# Patient Record
Sex: FEMALE | Race: WHITE | Employment: OTHER | URBAN - METROPOLITAN AREA
[De-identification: names, ages, dates, MRNs, and addresses within clinical notes are randomized per-mention and may not be internally consistent; named-entity substitution may affect disease eponyms.]

---

## 2017-03-21 ENCOUNTER — APPOINTMENT (OUTPATIENT)
Dept: PHYSICAL THERAPY | Age: 47
End: 2017-03-21

## 2017-11-20 ENCOUNTER — HOSPITAL ENCOUNTER (OUTPATIENT)
Dept: PHYSICAL THERAPY | Age: 47
Discharge: HOME | End: 2017-11-20
Payer: COMMERCIAL

## 2017-11-20 PROCEDURE — 97110 THERAPEUTIC EXERCISES: CPT

## 2017-11-20 PROCEDURE — 97162 PT EVAL MOD COMPLEX 30 MIN: CPT

## 2017-11-20 NOTE — PROGRESS NOTES
PT DAILY TREATMENT NOTE     Patient Name: Hanley Moritz  Date:2017  : 1970  [x]  Patient  Verified  Payor: BLUE CROSS / Plan: [facility] PPO / Product Type: PPO /    In time:300  Out time:345  Total Treatment Time (min): 45  Visit #: 1 of 8    Treatment Area: Pain in right hip [M25.551]    SUBJECTIVE  Pain Level (0-10 scale): 1/10 seated at rest  Any medication changes, allergies to medications, adverse drug reactions, diagnosis change, or new procedure performed?: [x] No    [] Yes (see summary sheet for update)  Subjective functional status/changes:   [] No changes reported  See POC    OBJECTIVE    Modality rationale:    Min Type Additional Details    [] Estim:  []Unatt       []IFC  []Premod                        []Other:  []w/ice   []w/heat  Position:  Location:    [] Estim: []Att    []TENS instruct  []NMES                    []Other:  []w/US   []w/ice   []w/heat  Position:  Location:    []  Traction: [] Cervical       []Lumbar                       [] Prone          []Supine                       []Intermittent   []Continuous Lbs:  [] before manual  [] after manual    []  Ultrasound: []Continuous   [] Pulsed                           []1MHz   []3MHz W/cm2:  Location:    []  Iontophoresis with dexamethasone         Location: [] Take home patch   [] In clinic    []  Ice     []  heat  []  Ice massage  []  Laser   []  Anodyne Position:  Location:    []  Laser with stim  []  Other:  Position:  Location:    []  Vasopneumatic Device Pressure:       [] lo [] med [] hi   Temperature: [] lo [] med [] hi   [] Skin assessment post-treatment:  []intact []redness- no adverse reaction    []redness - adverse reaction:     37 min [x]Eval                  []Re-Eval       8 min Therapeutic Exercise:  [] See flow sheet :issued and reviewed HEP   Rationale: increase ROM and increase strength to improve the patients ability to normalize ADL's     min Therapeutic Activity:  []  See flow sheet : min Neuromuscular Re-education:  []  See flow sheet :        min Manual Therapy:          min Gait Training:  ___ feet with ___ device on level surfaces with ___ level of assist   Rationale: With   [] TE   [] TA   [] neuro   [] other: Patient Education: [x] Review HEP    [] Progressed/Changed HEP based on:   [] positioning   [] body mechanics   [] transfers   [] heat/ice application    [] other:      Other Objective/Functional Measures:   Physical Therapy Evaluation- Hip  Pt c/o bilateral foot pain (left > right) with tight calves and hamstrings that in turn is affecting her right hip, onset earlier this year. Pt is being seen by 2 different orthopedic doctors in the same practice for her feet and her hip. Pain can wake pt up at night with pt sleeping in S/L. Pt's condition complicated by recent diagnosis of fibromyalgia.   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)    Posture:    Gait:  [] Normal    [] Abnormal    [] Antalgic    [] NWB    Device: none    Describe:         ROM/Strength        AROM                     PROM        Strength (1-5)  Hip Left Right Left Right Left Right   Flexion     4/5 4/5   Extension     4/5 4/5   Abduction     4/5 4+/5 with increased pain   Adduction         ER     4/5 4/5   IR     4+/5 4+/5   Knee Left Right Left Right Left Right   Extension     4+/5 4+/5   Flexion     4+/5 4+/5   B ankle DF strength: 5/5 with increased pain at ankle     Flexibility: [] Unable to assess at this time  Hamstrings:    (L) Tightness= [] WNL   [x] Min   [] Mod   [] Severe 10 degrees    (R) Tightness= [] WNL   [x] Min   [] Mod   [] Severe 10 degrees  Quadriceps:    (L) Tightness= [] WNL   [] Min   [] Mod   [] Severe    (R) Tightness= [] WNL   [] Min   [] Mod   [] Severe  Gastroc:    (L) Tightness= [] WNL   [] Min   [] Mod   [x] Severe 0 degrees with increased pain at ankle    (R) Tightness= [] WNL   [] Min   [] Mod   [x] Severe 0 degrees                                   Palpation  [] Min  [x] Mod  [] Severe    Location: right piriformis/glut region, right ITB region  [] Min  [] Mod  [] Severe    Location:  [] Min  [] Mod  [] Severe    Location:    Optional Tests  Shelton/ Parvez Test: [] Neg    [] Pos  Fausto Test:  [] Neg    [] Pos  Scouring Test: [] Neg    [] Pos  Trendelenberg:  [] Neg    [] Pos [] Left    [] Right  OberTest:   [] Neg    [] Pos  Ely's Test:  [] Neg    [] Pos  Piriformis Test:  [] Neg    [] Pos   Sub-talor alignment: [] Neurtral [] Pronation [] Supination  Forefoot alignment: [] Neutral [] Varus [] Valgus  Functional Leg Length (cm):   Right:  Left:  Discrepancy:    Other tests/ comments:  Long sit test: left post vs right ant: correct with MET's       Pain Level (0-10 scale) post treatment: 0/10    ASSESSMENT/Changes in Function: see POC    Patient will continue to benefit from skilled PT services to modify and progress therapeutic interventions, address ROM deficits, address strength deficits, analyze and address soft tissue restrictions, analyze and cue movement patterns, analyze and modify body mechanics/ergonomics and assess and modify postural abnormalities to attain remaining goals.      [x]  See Plan of Care  []  See progress note/recertification  []  See Discharge Summary         Progress towards goals / Updated goals:  See POC      PLAN  [x]  Upgrade activities as tolerated     [x]  Continue plan of care  []  Update interventions per flow sheet       []  Discharge due to:_  [x]  Other: ROM/stretching, LE strengthening, manual techniques, mod prn      Lamonte Ortiz PT 11/20/2017  2:52 PM    Future Appointments  Date Time Provider Kolby Sara   11/20/2017 3:00 PM Lamonte Ortiz PT [facility]

## 2017-11-20 NOTE — PROGRESS NOTES
[facility] at [address] [place] [address]  Phone: [phone]   Fax: [phone]    Plan of Care/ Statement of Necessity for Physical Therapy Services     Patient name: Jameson Corbin Start of Care: 2017   Referral source: Epi Wilkinson MD : 1970    Medical Diagnosis: Pain in right hip [M25.551]   Onset Date:    Treatment Diagnosis: right hip pain   Prior Hospitalization: see medical history Provider#: [id number]   Medications: Verified on Patient summary List    Comorbidities: fibromyalgia, arthritis, weight change of > 10lbs recently   Prior Level of Function: normal activity, no deficits    The Plan of Care and following information is based on the information from the initial evaluation. Assessment/ key information: Pt is a 51 yo female presenting to clinic with c/o bilateral foot pain (left > right) with tight calves and hamstrings that in turn is affecting her right hip, onset earlier this year. Pt is being seen by 2 different orthopedic doctors in the same practice for her feet and her hip. Pain can wake pt up at night with pt sleeping in S/L. Pt's condition complicated by recent diagnosis of fibromyalgia. On exam, innominates require correction today with MET's. Bilateral LE strength 4/5 to 4+/5 throughout muscle groups tested. Pt has mild HS tightness and significant gastroc tightness bilaterally. She has TTP to right piriformis/glut and ITB region. Pt would benefit from skilled PT intervention to address the findings and to include PT for B foot pain. Evaluation Complexity History MEDIUM  Complexity : 1-2 comorbidities / personal factors will impact the outcome/ POC ; Examination MEDIUM Complexity : 3 Standardized tests and measures addressing body structure, function, activity limitation and / or participation in recreation  ;Presentation MEDIUM Complexity : Evolving with changing characteristics  ; Clinical Decision Making MEDIUM Complexity : FOTO score of 26-74  Overall Complexity Rating: MEDIUM  Problem List: pain affecting function, decrease ROM, decrease strength, decrease ADL/ functional abilitiies, decrease activity tolerance and decrease flexibility/ joint mobility   Treatment Plan may include any combination of the following: Therapeutic exercise, Therapeutic activities, Neuromuscular re-education, Physical agent/modality, Manual therapy, Aquatic therapy and Patient education  Patient / Family readiness to learn indicated by: asking questions, trying to perform skills and interest  Persons(s) to be included in education: patient (P)  Barriers to Learning/Limitations: None  Patient Goal (s): pain relief strategies  Patient Self Reported Health Status: fair  Rehabilitation Potential: good    Short Term Goals: To be accomplished in 2 weeks:  1. Patient will be independent and compliant with HEP to achieve other goals. Status at eval: issued and reviewed HEP  2. Pt will report >/= 25% improvement in symptoms to increase activity/position tolerance. Status at eval: 0%  3. Innominates will remain aligned and stable to achieve other goals. Status at eval: right ant vs left post    Long Term Goals: To be accomplished in 4 weeks:  1. Increase gastroc flexibility by 10 degrees to normalize function. Status at eval: 0 degrees bilaterally  2. Pt will report 50% improvement in symptoms to increase activity/position tolerance. Status at eval: 0%  3. Increase bilateral hip strength for flex, ext and ER >/= 4+/5 to normalize function. Status at eval: 4/5 bilaterally for all      Frequency / Duration: Patient to be seen 2 times per week for 4 weeks.     Patient/ Caregiver education and instruction: Diagnosis, prognosis, exercises   [x]  Plan of care has been reviewed with DEMETRICE Stiles, PT 11/20/2017 2:54 PM  _____________________________________________________________________  I certify that the above Therapy Services are being furnished while the patient is under my care. I agree with the treatment plan and certify that this therapy is necessary.     Physician's Signature:____________________  Date:__________Time:______    Please sign and return to [facility] at [address] [place] [address]  Phone: [phone]   Fax: [phone]

## 2017-11-22 ENCOUNTER — HOSPITAL ENCOUNTER (OUTPATIENT)
Dept: PHYSICAL THERAPY | Age: 47
Discharge: HOME | End: 2017-11-22
Payer: COMMERCIAL

## 2017-11-22 PROCEDURE — 97110 THERAPEUTIC EXERCISES: CPT

## 2017-11-22 PROCEDURE — 97140 MANUAL THERAPY 1/> REGIONS: CPT

## 2017-11-22 PROCEDURE — 97112 NEUROMUSCULAR REEDUCATION: CPT

## 2017-11-22 NOTE — PROGRESS NOTES
PT DAILY TREATMENT NOTE     Patient Name: Vinay Bradley  Date:2017  : 1970  [x]  Patient  Verified  Payor: BLUE CROSS / Plan: [facility] PPO / Product Type: PPO /    In time:1600  Out time:1705  Total Treatment Time (min): 72  Visit #: 2 of 8    Treatment Area: Pain in right hip [M25.551]    SUBJECTIVE  Pain Level (0-10 scale): 0-1/10  Any medication changes, allergies to medications, adverse drug reactions, diagnosis change, or new procedure performed?: [x] No    [] Yes (see summary sheet for update)  Subjective functional status/changes:   [] No changes reported  I walked here today. I have been doing my exercises at home. Reviewed eval prior to tx.   OBJECTIVE    Modality rationale: decrease pain and increase tissue extensibility to improve the patients ability to improve tolerance for gait and therex   Min Type Additional Details    [] Estim:  []Unatt       []IFC  []Premod                        []Other:  []w/ice   []w/heat  Position:  Location:    [] Estim: []Att    []TENS instruct  []NMES                    []Other:  []w/US   []w/ice   []w/heat  Position:  Location:    []  Traction: [] Cervical       []Lumbar                       [] Prone          []Supine                       []Intermittent   []Continuous Lbs:  [] before manual  [] after manual    []  Ultrasound: []Continuous   [] Pulsed                           []1MHz   []3MHz W/cm2:  Location:    []  Iontophoresis with dexamethasone         Location: [] Take home patch   [] In clinic   10 []  Ice     [x]  heat  []  Ice massage  []  Laser   []  Anodyne Position:sidelying on left  Location:right posterior hip and ITB    []  Laser with stim  []  Other:  Position:  Location:    []  Vasopneumatic Device Pressure:       [] lo [] med [] hi   Temperature: [] lo [] med [] hi   [x] Skin assessment post-treatment:  [x]intact []redness- no adverse reaction    []redness - adverse reaction:      min []Eval []Re-Eval       23 min Therapeutic Exercise:  [x] See flow sheet :   Rationale: increase ROM, increase strength and improve coordination to improve the patients ability to improve function     min Therapeutic Activity:  []  See flow sheet :        15 min Neuromuscular Re-education:  [x]  See flow sheet :   Rationale: increase strength, improve coordination and increase proprioception  to improve the patients ability to improve function    17 min Manual Therapy:  Statis and dynamic SIJ assessment,DTM/TPM right piriformis and mm glides right ITB   Rationale: increase ROM, increase tissue extensibility and decrease trigger points to normalize gait and function     min Gait Training:  ___ feet with ___ device on level surfaces with ___ level of assist   Rationale: With   [x] TE   [x] TA   [] neuro   [] other: Patient Education: [x] Review HEP    [] Progressed/Changed HEP based on:   [x] positioning   [] body mechanics   [] transfers   [x] heat/ice application    [] other:      Other Objective/Functional Measures: see goal review     Pain Level (0-10 scale) post treatment: 0/10    ASSESSMENT/Changes in Function: Pt presents with tight right hip girdle and ITB with stable innominates. Patient will continue to benefit from skilled PT services to modify and progress therapeutic interventions, address ROM deficits, address strength deficits, analyze and address soft tissue restrictions, analyze and cue movement patterns, analyze and modify body mechanics/ergonomics and instruct in home and community integration to attain remaining goals. [x]  See Plan of Care. Innominates stable today with mild TTP at left ASIS. []  See progress note/recertification  []  See Discharge Summary         Progress towards goals / Updated goals:  Short Term Goals: To be accomplished in 2 weeks:  1. Patient will be independent and compliant with HEP to achieve other goals.   Status at eval: issued and reviewed HEP  Current: pt reports compliance  2. Pt will report >/= 25% improvement in symptoms to increase activity/position tolerance. Status at eval: 0%  Current: no change  3. Innominates will remain aligned and stable to achieve other goals. Status at eval: right ant vs left post  Current: innominates stable today, mild TTP at left ASIS     Long Term Goals: To be accomplished in 4 weeks:  1. Increase gastroc flexibility by 10 degrees to normalize function. Status at eval: 0 degrees bilaterally  Current: no change  2. Pt will report 50% improvement in symptoms to increase activity/position tolerance. Status at eval: 0%  Current: no change  3. Increase bilateral hip strength for flex, ext and ER >/= 4+/5 to normalize function.   Status at eval: 4/5 bilaterally for all  Current: introduced 3 way hip and ER PRE's today       PLAN  [x]  Upgrade activities as tolerated     [x]  Continue plan of care  []  Update interventions per flow sheet       []  Discharge due to:_  []  Other:_      Amalia Genao PTA 11/22/2017  4:14 PM    Future Appointments  Date Time Provider Kolby Ruiz   11/28/2017 8:00 AM Amalia Genao PTA [facility]   11/29/2017 12:45 PM Elvin Bardales PTA [facility]   12/5/2017 7:30 AM Amalia Genao PTA [facility]   12/6/2017 11:15 AM Elvin Bardales PTA [facility]   12/13/2017 3:15 PM Elvin Bardales PTA [facility]   12/15/2017 12:30 PM Elvin Bardales PTA [facility]   12/18/2017 1:00 PM Elvin Bardales PTA [facility]   12/20/2017 2:30 PM Amalia Genao PTA [facility]   12/27/2017 2:30 PM Amalia Genao PTA [facility]

## 2017-11-27 ENCOUNTER — APPOINTMENT (OUTPATIENT)
Dept: PHYSICAL THERAPY | Age: 47
End: 2017-11-27
Payer: COMMERCIAL

## 2017-11-28 ENCOUNTER — HOSPITAL ENCOUNTER (OUTPATIENT)
Dept: PHYSICAL THERAPY | Age: 47
Discharge: HOME | End: 2017-11-28
Payer: COMMERCIAL

## 2017-11-28 PROCEDURE — 97112 NEUROMUSCULAR REEDUCATION: CPT

## 2017-11-28 PROCEDURE — 97110 THERAPEUTIC EXERCISES: CPT

## 2017-11-28 NOTE — PROGRESS NOTES
PT DAILY TREATMENT NOTE     Patient Name: Ras Lucia  Date:2017  : 1970  [x]  Patient  Verified  Payor: BLUE CROSS / Plan: [facility] PPO / Product Type: PPO /    In time:0755  Out time:0850  Total Treatment Time (min): 54  Visit #: 3 of 8    Treatment Area: Pain in right hip [M25.551]    SUBJECTIVE  Pain Level (0-10 scale): 0/10  Any medication changes, allergies to medications, adverse drug reactions, diagnosis change, or new procedure performed?: [x] No    [] Yes (see summary sheet for update)  Subjective functional status/changes:   [x] No changes reported      OBJECTIVE    Modality rationale:    Min Type Additional Details    [] Estim:  []Unatt       []IFC  []Premod                        []Other:  []w/ice   []w/heat  Position:  Location:    [] Estim: []Att    []TENS instruct  []NMES                    []Other:  []w/US   []w/ice   []w/heat  Position:  Location:    []  Traction: [] Cervical       []Lumbar                       [] Prone          []Supine                       []Intermittent   []Continuous Lbs:  [] before manual  [] after manual    []  Ultrasound: []Continuous   [] Pulsed                           []1MHz   []3MHz W/cm2:  Location:    []  Iontophoresis with dexamethasone         Location: [] Take home patch   [] In clinic    []  Ice     []  heat  []  Ice massage  []  Laser   []  Anodyne Position:  Location:    []  Laser with stim  []  Other:  Position:  Location:    []  Vasopneumatic Device Pressure:       [] lo [] med [] hi   Temperature: [] lo [] med [] hi   [] Skin assessment post-treatment:  []intact []redness- no adverse reaction    []redness - adverse reaction:      min []Eval                  []Re-Eval       32 min Therapeutic Exercise:  [x] See flow sheet :   Rationale: increase ROM, increase strength and improve coordination to improve the patients ability to normalize function     min Therapeutic Activity:  []  See flow sheet :        23 min Neuromuscular Re-education:  [x]  See flow sheet :added 90/90 hip lift   Rationale: increase strength, improve coordination and increase proprioception  to improve the patients ability to normalize function     min Manual Therapy:          min Gait Training:  ___ feet with ___ device on level surfaces with ___ level of assist   Rationale: With   [x] TE   [] TA   [] neuro   [] other: Patient Education: [x] Review HEP  Issued handout for 90/90  [] Progressed/Changed HEP based on:   [] positioning   [] body mechanics   [] transfers   [] heat/ice application    [] other:      Other Objective/Functional Measures:     Pain Level (0-10 scale) post treatment: 0/10    ASSESSMENT/Changes in Function: Pt reporting no pain with some soreness following therex and presents with stable innominates. Patient will continue to benefit from skilled PT services to modify and progress therapeutic interventions, address ROM deficits, address strength deficits, analyze and address soft tissue restrictions, analyze and cue movement patterns, analyze and modify body mechanics/ergonomics and instruct in home and community integration to attain remaining goals. [x]  See Plan of Care  []  See progress note/recertification  []  See Discharge Summary         Progress towards goals / Updated goals:  Short Term Goals: To be accomplished in 2 weeks:  1. Patient will be independent and compliant with HEP to achieve other goals. Status at eval: issued and reviewed HEP  Current: pt reports compliance  2. Pt will report >/= 25% improvement in symptoms to increase activity/position tolerance. Status at eval: 0%  Current: no change  3. Innominates will remain aligned and stable to achieve other goals. Status at eval: right ant vs left post  Current: innominates stable today, mild TTP at left ASIS      Long Term Goals: To be accomplished in 4 weeks:  1. Increase gastroc flexibility by 10 degrees to normalize function.   Status at eval: 0 degrees bilaterally  Current: no change; pt compliant with stretching  2. Pt will report 50% improvement in symptoms to increase activity/position tolerance. Status at eval: 0%  Current: no change  3. Increase bilateral hip strength for flex, ext and ER >/= 4+/5 to normalize function.   Status at eval: 4/5 bilaterally for all  Current: introduced 3 way hip and ER PRE's today          PLAN  [x]  Upgrade activities as tolerated     [x]  Continue plan of care  []  Update interventions per flow sheet       []  Discharge due to:_  []  Other:_      Karolyn Mitchell PTA 11/28/2017  8:07 AM    Future Appointments  Date Time Provider Kolby Ruiz   11/29/2017 12:45 PM [facility]RICE [facility]   12/5/2017 7:30 AM Karolyn Mitchell PTA [facility]   12/6/2017 11:15 AM Marianela Matt PTA [facility]   12/13/2017 3:15 PM Marianela Matt PTA [facility]   12/15/2017 12:30 PM Marianela Matt PTA [facility]   12/18/2017 1:00 PM Marianela Matt PTA [facility]   12/20/2017 2:30 PM Karolyn Mitchell PTA [facility]   12/27/2017 2:30 PM Karolyn Mitchell PTA [facility]

## 2017-11-29 ENCOUNTER — APPOINTMENT (OUTPATIENT)
Dept: PHYSICAL THERAPY | Age: 47
End: 2017-11-29
Payer: COMMERCIAL

## 2017-11-29 ENCOUNTER — HOSPITAL ENCOUNTER (OUTPATIENT)
Dept: PHYSICAL THERAPY | Age: 47
Discharge: HOME | End: 2017-11-29
Payer: COMMERCIAL

## 2017-11-29 PROCEDURE — 97110 THERAPEUTIC EXERCISES: CPT

## 2017-11-29 PROCEDURE — 97112 NEUROMUSCULAR REEDUCATION: CPT

## 2017-11-29 NOTE — PROGRESS NOTES
PT DAILY TREATMENT NOTE     Patient Name: Grace Hameed  Date:2017  : 1970  [x]  Patient  Verified  Payor: BLUE SOMWYA / Plan: [facility] [id number] PPO / Product Type: PPO /    In time:459  Out time:544  Total Treatment Time (min): 45  Visit #: 4 of 8    Treatment Area: Pain in right hip [M25.551]    SUBJECTIVE  Pain Level (0-10 scale): 0/10  Any medication changes, allergies to medications, adverse drug reactions, diagnosis change, or new procedure performed?: [x] No    [] Yes (see summary sheet for update)  Subjective functional status/changes:   [] No changes reported  Pt reports that she has been walking and is feeling better as well as sleeping better    OBJECTIVE        25 min Therapeutic Exercise:  [x] See flow sheet :   Rationale: increase ROM, increase strength, improve coordination, improve balance and increase proprioception to improve the patients ability to ambulate community distances    20 min Neuromuscular Re-education:  []  See flow sheet :   Rationale: increase ROM, increase strength, improve coordination, improve balance and increase proprioception  to improve the patients ability to ambulate community distances    With   [x] TE   [] TA   [x] neuro   [] other: Patient Education: [x] Review HEP    [] Progressed/Changed HEP based on:   [x] positioning   [x] body mechanics   [x] transfers   [] heat/ice application    [] other:      Other Objective/Functional Measures:      Pain Level (0-10 scale) post treatment: 0/10    ASSESSMENT/Changes in Function: Pt demonstrates glute medius weakness contributing to pain with ambulating community distances. She has reported improved function with reduced pain since start of care however still requires cueing for form due to weakness in core and LE.     Patient will continue to benefit from skilled PT services to address functional mobility deficits, address ROM deficits, address strength deficits and analyze and address soft tissue restrictions to attain remaining goals. []  See Plan of Care  []  See progress note/recertification  []  See Discharge Summary         Progress towards goals / Updated goals:  Short Term Goals: To be accomplished in 2 weeks:  1. Patient will be independent and compliant with HEP to achieve other goals. Status at eval: issued and reviewed HEP  Current: pt reports compliance  2. Pt will report >/= 25% improvement in symptoms to increase activity/position tolerance. Status at eval: 0%  Current: no change  3. Innominates will remain aligned and stable to achieve other goals. Status at eval: right ant vs left post  Current: innominates stable today      Long Term Goals: To be accomplished in 4 weeks:  1. Increase gastroc flexibility by 10 degrees to normalize function. Status at eval: 0 degrees bilaterally  Current: no change; pt compliant with stretching  2. Pt will report 50% improvement in symptoms to increase activity/position tolerance. Status at eval: 0%  Current: Progressing with reduced tension and sleeping better  3. Increase bilateral hip strength for flex, ext and ER >/= 4+/5 to normalize function.   Status at eval: 4/5 bilaterally for all  Current:progressing however requires frequent cues for form during standing exercises        PLAN  []  Upgrade activities as tolerated     []  Continue plan of care  []  Update interventions per flow sheet       []  Discharge due to:_  []  Other:_      Callie Bedolla PTA 11/29/2017  5:40 PM    Future Appointments  Date Time Provider Kolby Ruiz   12/5/2017 7:30 AM DEMETRICE RickettsHPJONNA [facility]   12/6/2017 11:15 AM Callie Bedolla PTA [facility]   12/13/2017 3:15 PM Callie Falls, PTA [facility]   12/15/2017 12:30 PM Callie Bedolla PTA [facility]   12/18/2017 1:00 PM Callie Bedolla PTA [facility]   12/20/2017 2:30 PM DEMETRICE RickettsHPTVIANEY [facility]   12/27/2017 2:30 PM DEMETRICE RickettsHPTVIANEY [facility]

## 2017-12-05 ENCOUNTER — HOSPITAL ENCOUNTER (OUTPATIENT)
Dept: PHYSICAL THERAPY | Age: 47
Discharge: HOME | End: 2017-12-05
Payer: COMMERCIAL

## 2017-12-05 PROCEDURE — 97110 THERAPEUTIC EXERCISES: CPT

## 2017-12-05 PROCEDURE — 97112 NEUROMUSCULAR REEDUCATION: CPT

## 2017-12-05 NOTE — PROGRESS NOTES
PT DAILY TREATMENT NOTE     Patient Name: Fae Bumpers  Date:2017  : 1970  [x]  Patient  Verified  Payor: BLUE CROSS / Plan: [facility] PPO / Product Type: PPO /    In time:735  Out time:840  Total Treatment Time (min): 65  Visit #: 5 of 8    Treatment Area: Pain in right hip [M25.551]    SUBJECTIVE  Pain Level (0-10 scale): 2/10  Any medication changes, allergies to medications, adverse drug reactions, diagnosis change, or new procedure performed?: [x] No    [] Yes (see summary sheet for update)  Subjective functional status/changes:   [] No changes reported  I am walking more now - about 5 miles/day and I am noticing some lumbopelvic pain.     OBJECTIVE    Modality rationale:    Min Type Additional Details    [] Estim:  []Unatt       []IFC  []Premod                        []Other:  []w/ice   []w/heat  Position:  Location:    [] Estim: []Att    []TENS instruct  []NMES                    []Other:  []w/US   []w/ice   []w/heat  Position:  Location:    []  Traction: [] Cervical       []Lumbar                       [] Prone          []Supine                       []Intermittent   []Continuous Lbs:  [] before manual  [] after manual    []  Ultrasound: []Continuous   [] Pulsed                           []1MHz   []3MHz W/cm2:  Location:    []  Iontophoresis with dexamethasone         Location: [] Take home patch   [] In clinic    []  Ice     []  heat  []  Ice massage  []  Laser   []  Anodyne Position:  Location:    []  Laser with stim  []  Other:  Position:  Location:    []  Vasopneumatic Device Pressure:       [] lo [] med [] hi   Temperature: [] lo [] med [] hi   [] Skin assessment post-treatment:  []intact []redness- no adverse reaction    []redness - adverse reaction:      min []Eval                  []Re-Eval       30 min Therapeutic Exercise:  [x] See flow sheet :   Rationale: increase ROM, increase strength and improve coordination to improve the patients ability to improve gait and function     min Therapeutic Activity:  []  See flow sheet :        25 min Neuromuscular Re-education:  [x]  See flow sheet :90/90 hip lift and Oov training in supine   Rationale: increase ROM, increase strength, improve coordination, improve balance and increase proprioception  to improve the patients ability to improve ADL's     min Manual Therapy:          min Gait Training:  ___ feet with ___ device on level surfaces with ___ level of assist   Rationale: With   [] TE   [] TA   [] neuro   [] other: Patient Education: [x] Review HEP  Issued handout for 90/90 hip lift  [] Progressed/Changed HEP based on:   [] positioning   [] body mechanics   [] transfers   [] heat/ice application    [] other:      Other Objective/Functional Measures: FOTO: 66/100     Pain Level (0-10 scale) post treatment: 0/10    ASSESSMENT/Changes in Function: Pt now increasing walking distances with c/o lumbopelvic pain. Patient will continue to benefit from skilled PT services to modify and progress therapeutic interventions, address strength deficits, analyze and address soft tissue restrictions, analyze and cue movement patterns, analyze and modify body mechanics/ergonomics, assess and modify postural abnormalities and instruct in home and community integration to attain remaining goals. [x]  See Plan of Care  []  See progress note/recertification  []  See Discharge Summary         Progress towards goals / Updated goals:  Short Term Goals: To be accomplished in 2 weeks:  1. Patient will be independent and compliant with HEP to achieve other goals. Status at eval: issued and reviewed HEP  Current: pt reports compliance  2. Pt will report >/= 25% improvement in symptoms to increase activity/position tolerance. Status at eval: 0%  Current: no change  3. Innominates will remain aligned and stable to achieve other goals.   Status at eval: right ant vs left post  Current: innominates stable today  [facility] accomplished in 4 weeks:  1. Increase gastroc flexibility by 10 degrees to normalize function. Status at eval: 0 degrees bilaterally  Current: no change; pt compliant with stretching  2. Pt will report 50% improvement in symptoms to increase activity/position tolerance. Status at eval: 0%  Current: Progressing with reduced tension and sleeping better  3. Increase bilateral hip strength for flex, ext and ER >/= 4+/5 to normalize function. Status at eval: 4/5 bilaterally for all  Current:progressing however requires frequent cues for form during standing exercises        PLAN  [x]  Upgrade activities as tolerated     [x]  Continue plan of care  []  Update interventions per flow sheet       []  Discharge due to:_  [x]  Other:_  Continue stabilization and hip work on [place].     Sulma Pink PTA 12/5/2017  7:39 AM    Future Appointments  Date Time Provider Kolby Ruiz   12/6/2017 11:15 AM DEMETRICE Russell [facility]   12/13/2017 3:15 PM Tiffanie GALARZA [facility]   12/15/2017 12:30 PM DEMETRICE Russell [facility]   12/18/2017 1:00 PM DEMETRICE Russell [facility]   12/20/2017 2:30 PM DEMETRICE Mcguire [facility]   12/27/2017 2:30 PM DEMETRICE Mcguire THE [facility]

## 2017-12-06 ENCOUNTER — APPOINTMENT (OUTPATIENT)
Dept: PHYSICAL THERAPY | Age: 47
End: 2017-12-06
Payer: COMMERCIAL

## 2017-12-06 ENCOUNTER — HOSPITAL ENCOUNTER (OUTPATIENT)
Dept: PHYSICAL THERAPY | Age: 47
Discharge: HOME | End: 2017-12-06
Payer: COMMERCIAL

## 2017-12-06 PROCEDURE — 97110 THERAPEUTIC EXERCISES: CPT

## 2017-12-06 PROCEDURE — 97112 NEUROMUSCULAR REEDUCATION: CPT

## 2017-12-06 NOTE — PROGRESS NOTES
PT DAILY TREATMENT NOTE     Patient Name: Ivan Close  Date:2017  : 1970  [x]  Patient  Verified  Payor: BLUE CROSS / Plan: [facility] PPO / Product Type: PPO /    In time:1130  Out time:1225  Total Treatment Time (min): 54  Visit #: 6 of 8    Treatment Area: Pain in right hip [M25.551]    SUBJECTIVE  Pain Level (0-10 scale): 3/10  Any medication changes, allergies to medications, adverse drug reactions, diagnosis change, or new procedure performed?: [x] No    [] Yes (see summary sheet for update)  Subjective functional status/changes:   [] No changes reported  Pt reports that she has been walking 5 miles daily but has been walking with her  who has a large stride length which has been challenging and her back is cranky today    OBJECTIVE    Modality rationale: decrease pain and increase tissue extensibility to improve the patients ability to ambulate community distances without limitation   Min Type Additional Details    [] Estim:  []Unatt       []IFC  []Premod                        []Other:  []w/ice   []w/heat  Position:  Location:    [] Estim: []Att    []TENS instruct  []NMES                    []Other:  []w/US   []w/ice   []w/heat  Position:  Location:    []  Traction: [] Cervical       []Lumbar                       [] Prone          []Supine                       []Intermittent   []Continuous Lbs:  [] before manual  [] after manual    []  Ultrasound: []Continuous   [] Pulsed                           []1MHz   []3MHz W/cm2:  Location:    []  Iontophoresis with dexamethasone         Location: [] Take home patch   [] In clinic   10 []  Ice     [x]  heat  []  Ice massage  []  Laser   []  Anodyne Position:supineLocation:low back    []  Laser with stim  []  Other:  Position:  Location:    []  Vasopneumatic Device Pressure:       [] lo [] med [] hi   Temperature: [] lo [] med [] hi   [x] Skin assessment post-treatment:  [x]intact [x]redness- no adverse reaction 15 min Therapeutic Exercise:  [x] See flow sheet :   Rationale: increase ROM, increase strength, improve coordination, improve balance and increase proprioception to improve the patients ability to ambulate community distances without limitation    30 min Neuromuscular Re-education:  []  See flow sheet :   Rationale: increase ROM, increase strength, improve coordination, improve balance and increase proprioception  to improve the patients ability to ambulate community distances without limitation          With   [x] TE   [] TA   [x] neuro   [] other: Patient Education: [x] Review HEP    [] Progressed/Changed HEP based on:   [] positioning   [x] body mechanics   [] transfers   [x] heat/ice application    [] other:      Other Objective/Functional Measures:      Pain Level (0-10 scale) post treatment: 0/10    ASSESSMENT/Changes in Function: Pt demonstrates weakened core and LE contributing to recent back pain from increased activity. Pt challenged with 2 points of contact on oov in supine and in quadruped. Patient will continue to benefit from skilled PT services to address functional mobility deficits, address ROM deficits, address strength deficits, analyze and address soft tissue restrictions, analyze and cue movement patterns, analyze and modify body mechanics/ergonomics and assess and modify postural abnormalities to attain remaining goals. []  See Plan of Care  []  See progress note/recertification  []  See Discharge Summary         Progress towards goals / Updated goals:  Short Term Goals: To be accomplished in 2 weeks:  1. Patient will be independent and compliant with HEP to achieve other goals. Status at eval: issued and reviewed HEP  Current: pt reports compliance  2. Pt will report >/= 25% improvement in symptoms to increase activity/position tolerance. Status at eval: 0%  Current: MET: 60% improvement  3. Innominates will remain aligned and stable to achieve other goals.   Status at eval: right ant vs left post  Current: MET: innominates stable      Long Term Goals: To be accomplished in 4 weeks:  1. Increase gastroc flexibility by 10 degrees to normalize function. Status at eval: 0 degrees bilaterally  Current: No change however pt provided with elongated hamstring stretches for HEP to promote lengthening  2. Pt will report 50% improvement in symptoms to increase activity/position tolerance. Status at eval: 0%  Current: NEARLY MET: reporting 60% improvement today  3. Increase bilateral hip strength for flex, ext and ER >/= 4+/5 to normalize function.   Status at eval: 4/5 bilaterally for all  Current:progressing however requires frequent cues for form during standing exercises        PLAN  []  Upgrade activities as tolerated     [x]  Continue plan of care  []  Update interventions per flow sheet       []  Discharge due to:_  []  Other:_      Laci Barrera PTA 12/6/2017  11:47 AM    Future Appointments  Date Time Provider Kolby Ruiz   12/13/2017 3:15 PM Nestor GALARZA THE [facility]   12/15/2017 12:30 PM Laci Barrera PTA [facility]   12/18/2017 1:00 PM Laci Barrera PTA [facility]   12/20/2017 2:30 PM Dewey Del Rosario PTA [facility]   12/27/2017 2:30 PM Dewey Del Rosario PTA [facility]

## 2017-12-07 ENCOUNTER — APPOINTMENT (OUTPATIENT)
Dept: PHYSICAL THERAPY | Age: 47
End: 2017-12-07
Payer: COMMERCIAL

## 2017-12-11 ENCOUNTER — APPOINTMENT (OUTPATIENT)
Dept: PHYSICAL THERAPY | Age: 47
End: 2017-12-11
Payer: COMMERCIAL

## 2017-12-12 ENCOUNTER — HOSPITAL ENCOUNTER (OUTPATIENT)
Dept: PHYSICAL THERAPY | Age: 47
Discharge: HOME | End: 2017-12-12
Payer: COMMERCIAL

## 2017-12-12 PROCEDURE — 97112 NEUROMUSCULAR REEDUCATION: CPT

## 2017-12-12 PROCEDURE — 97110 THERAPEUTIC EXERCISES: CPT

## 2017-12-12 NOTE — PROGRESS NOTES
PT DAILY TREATMENT NOTE     Patient Name: Em Calvin  Date:2017  : 1970  [x]  Patient  Verified  Payor: BLUE CROSS / Plan: [facility] PPO / Product Type: PPO /    In time:0750  Out CARIE:1465  Total Treatment Time (min): 48  Visit #: 7 of 8    Treatment Area: Pain in right hip [M25.551]    SUBJECTIVE  Pain Level (0-10 scale): 0/10  Any medication changes, allergies to medications, adverse drug reactions, diagnosis change, or new procedure performed?: [x] No    [] Yes (see summary sheet for update)  Subjective functional status/changes:   [] No changes reported  Things have been good. The pain is abolished - I am just having issues with flexibility.     OBJECTIVE    Modality rationale:    Min Type Additional Details    [] Estim:  []Unatt       []IFC  []Premod                        []Other:  []w/ice   []w/heat  Position:  Location:    [] Estim: []Att    []TENS instruct  []NMES                    []Other:  []w/US   []w/ice   []w/heat  Position:  Location:    []  Traction: [] Cervical       []Lumbar                       [] Prone          []Supine                       []Intermittent   []Continuous Lbs:  [] before manual  [] after manual    []  Ultrasound: []Continuous   [] Pulsed                           []1MHz   []3MHz W/cm2:  Location:    []  Iontophoresis with dexamethasone         Location: [] Take home patch   [] In clinic    []  Ice     []  heat  []  Ice massage  []  Laser   []  Anodyne Position:  Location:    []  Laser with stim  []  Other:  Position:  Location:    []  Vasopneumatic Device Pressure:       [] lo [] med [] hi   Temperature: [] lo [] med [] hi   [] Skin assessment post-treatment:  []intact []redness- no adverse reaction    []redness - adverse reaction:      min []Eval                  []Re-Eval       23 min Therapeutic Exercise:  [x] See flow sheet :   Rationale: increase ROM, increase strength and improve coordination to improve the patients ability to normalize function     min Therapeutic Activity:  []  See flow sheet :        25 min Neuromuscular Re-education:  [x]  See flow sheet :   Rationale: increase ROM, increase strength, improve coordination, improve balance and increase proprioception  to improve the patients ability to normalize function     min Manual Therapy:          min Gait Training:  ___ feet with ___ device on level surfaces with ___ level of assist   Rationale: With   [] TE   [] TA   [] neuro   [] other: Patient Education: [x] Review HEP    [] Progressed/Changed HEP based on:   [] positioning   [] body mechanics   [] transfers   [] heat/ice application    [] other:      Other Objective/Functional Measures: see goal review     Pain Level (0-10 scale) post treatment: 0/10    ASSESSMENT/Changes in Function: Pt is currently asymptomatic and pain free but would benefit from continued PT to promote strength and stability. Patient will continue to benefit from skilled PT services to modify and progress therapeutic interventions, address ROM deficits, address strength deficits, analyze and address soft tissue restrictions, analyze and cue movement patterns, analyze and modify body mechanics/ergonomics and instruct in home and community integration to attain remaining goals. [x]  See Plan of Care  []  See progress note/recertification  []  See Discharge Summary         Progress towards goals / Updated goals:  Short Term Goals: To be accomplished in 2 weeks:  1. Patient will be independent and compliant with HEP to achieve other goals. Status at eval: issued and reviewed HEP  Current: pt reports compliance  2. Pt will report >/= 25% improvement in symptoms to increase activity/position tolerance. Status at eval: 0%  Current: MET: 60% improvement  3. Innominates will remain aligned and stable to achieve other goals.   Status at eval: right ant vs left post  Current: MET: innominates stable      Long Term Goals: To be accomplished in 4 weeks:  1. Increase gastroc flexibility by 10 degrees to normalize function. Status at eval: 0 degrees bilaterally  Current: No change however pt provided with elongated hamstring stretches for HEP to promote lengthening  2. Pt will report 50% improvement in symptoms to increase activity/position tolerance. Status at eval: 0%  Current:  MET: reporting 90% improvement today  3. Increase bilateral hip strength for flex, ext and ER >/= 4+/5 to normalize function.   Status at eval: 4/5 bilaterally for all  Current:progressing however requires frequent cues for form during standing exercises        PLAN  [x]  Upgrade activities as tolerated     [x]  Continue plan of care  []  Update interventions per flow sheet       []  Discharge due to:_  [x]  Other:_Continue PT 1-2 times per week to progress LE strength and stability      Zaire Lauren PTA 12/12/2017  7:48 AM    Future Appointments  Date Time Provider Kolby Ruiz   12/12/2017 8:00 AM DEMETRICE Light THE [facility]   12/13/2017 10:30 AM Manny Bourgeois, PT MICHELL THE [facility]   12/18/2017 1:00 PM DEMETRICE Ashraf THE [facility]   12/20/2017 2:30 PM DEMETRICE Light THE [facility]   12/27/2017 2:30 PM DEMETRICE Light THE [facility]

## 2017-12-13 ENCOUNTER — HOSPITAL ENCOUNTER (OUTPATIENT)
Dept: PHYSICAL THERAPY | Age: 47
Discharge: HOME | End: 2017-12-13
Payer: COMMERCIAL

## 2017-12-13 PROCEDURE — 97112 NEUROMUSCULAR REEDUCATION: CPT

## 2017-12-13 PROCEDURE — 97110 THERAPEUTIC EXERCISES: CPT

## 2017-12-13 NOTE — PROGRESS NOTES
[facility] at [address] [place] [address]  Phone: [phone]   Fax: [phone]    Progress Note  Patient name: Fae Bumpers Start of Care: 17   Referral source: Beverly Vela MD : 1970   Medical/Treatment Diagnosis: Pain in right hip [M25.551] Onset Date:     Prior Hospitalization: see medical history Provider#: [id number]   Medications: Verified on Patient Summary List    Comorbidities: fibromyalgia, arthritis, weight change of > 10lbs recently  Prior Level of Function:normal activity, no deficits    Visits from Start of Care: 8    Missed Visits: 0    Established Goals:          Excellent Good         Limited           None  [x] Increased ROM   []  [x]  [x]  []  [x] Increased Strength  []  [x]  [x]  []  [] Increased Mobility  []  []  []  []   [x] Decreased Pain   [x]  [x]  []  []  [] Decreased Swelling  []  []  []  []    Key Functional Changes: Treatment has included ROM/stretching, LE strength/stability, and Oov training. Pt is currently asymptomatic and pain free but would benefit from continued PT to promote strength and stability. Short Term Goals: To be accomplished in 2 weeks:  1. Patient will be independent and compliant with HEP to achieve other goals. Status at eval: issued and reviewed HEP  Current: pt reports compliance and progressing with independence  2. Pt will report >/= 25% improvement in symptoms to increase activity/position tolerance. Status at eval: 0%  Current: MET: 60% improvement  3. Innominates will remain aligned and stable to achieve other goals. Status at eval: right ant vs left post  Current: MET: innominates stable      Long Term Goals: To be accomplished in 4 weeks:  1. Increase gastroc flexibility by 10 degrees to normalize function. Status at eval: 0 degrees bilaterally  Current: No change however pt provided with elongated hamstring stretches for HEP to promote lengthening  2.  Pt will report 50% improvement in symptoms to increase activity/position tolerance. Status at eval: 0%  Current:  MET: reporting 90% improvement today  3. Increase bilateral hip strength for flex, ext and ER >/= 4+/5 to normalize function. Status at eval: 4/5 bilaterally for all  Current:progressing however requires frequent cues for form during standing exercises      Updated Goals: to be achieved in 2 weeks:  Continue with unmet goals above. ASSESSMENT/RECOMMENDATIONS:  [x]Continue therapy per initial plan/protocol at a frequency of  2 x per week for 2 weeks  []Continue therapy with the following recommended changes:_____________________      _____________________________________________________________________  []Discontinue therapy progressing towards or have reached established goals  []Discontinue therapy due to lack of appreciable progress towards goals  []Discontinue therapy due to lack of attendance or compliance  []Await Physician's recommendations/decisions regarding therapy  []Other:________________________________________________________________    Thank you for this referral.   Pina Rowland, PT 12/13/2017 9:56 AM  NOTE TO PHYSICIAN:  PLEASE COMPLETE THE ORDERS BELOW AND   FAX TO [facility]: [phone]) [phone]  If you are unable to process this request in 24 hours please contact our office:   [phone]  []  I have read the above report and request that my patient continue as recommended. []  I have read the above report and request that my patient continue therapy with the following changes/special instructions:________________________________________  []I have read the above report and request that my patient be discharged from therapy.     Physicians signature: ______________________________Date: ______Time:______

## 2017-12-13 NOTE — PROGRESS NOTES
PT DAILY TREATMENT NOTE     Patient Name: Ella Portal  Date:2017  : 1970  [x]  Patient  Verified  Payor: BLUE CROSS / Plan: [facility] PPO / Product Type: PPO /    In time:1032  Out time:1105  Total Treatment Time (min): 33  Visit #: 8 of 8    Treatment Area: Pain in right hip [M25.551]    SUBJECTIVE  Pain Level (0-10 scale): 0/10  Any medication changes, allergies to medications, adverse drug reactions, diagnosis change, or new procedure performed?: [x] No    [] Yes (see summary sheet for update)  Subjective functional status/changes:   [x] No changes reported      OBJECTIVE    Modality rationale:    Min Type Additional Details    [] Estim:  []Unatt       []IFC  []Premod                        []Other:  []w/ice   []w/heat  Position:  Location:    [] Estim: []Att    []TENS instruct  []NMES                    []Other:  []w/US   []w/ice   []w/heat  Position:  Location:    []  Traction: [] Cervical       []Lumbar                       [] Prone          []Supine                       []Intermittent   []Continuous Lbs:  [] before manual  [] after manual    []  Ultrasound: []Continuous   [] Pulsed                           []1MHz   []3MHz W/cm2:  Location:    []  Iontophoresis with dexamethasone         Location: [] Take home patch   [] In clinic    []  Ice     []  heat  []  Ice massage  []  Laser   []  Anodyne Position:  Location:    []  Laser with stim  []  Other:  Position:  Location:    []  Vasopneumatic Device Pressure:       [] lo [] med [] hi   Temperature: [] lo [] med [] hi   [] Skin assessment post-treatment:  []intact []redness- no adverse reaction    []redness - adverse reaction:      min []Eval                  []Re-Eval       23 min Therapeutic Exercise:  [x] See flow sheet :   Rationale: increase ROM and increase strength to improve the patients ability to improve mobility     min Therapeutic Activity:  []  See flow sheet :        10 min Neuromuscular Re-education: [x]  See flow sheet :   Rationale: increase strength and increase proprioception  to improve the patients ability to normalize function     min Manual Therapy:          min Gait Training:  ___ feet with ___ device on level surfaces with ___ level of assist   Rationale: With   [] TE   [] TA   [] neuro   [] other: Patient Education: [x] Review HEP    [] Progressed/Changed HEP based on:   [] positioning   [] body mechanics   [] transfers   [] heat/ice application    [] other:      Other Objective/Functional Measures: none taken today     Pain Level (0-10 scale) post treatment: 0/10    ASSESSMENT/Changes in Function: No new progress. Patient will continue to benefit from skilled PT services to modify and progress therapeutic interventions, address ROM deficits, address strength deficits, analyze and address soft tissue restrictions, analyze and cue movement patterns, analyze and modify body mechanics/ergonomics and assess and modify postural abnormalities to attain remaining goals. []  See Plan of Care  [x]  See progress note/recertification  []  See Discharge Summary         Progress towards goals / Updated goals:  Short Term Goals: To be accomplished in 2 weeks:  1. Patient will be independent and compliant with HEP to achieve other goals. Status at eval: issued and reviewed HEP  Current: pt reports compliance  2. Pt will report >/= 25% improvement in symptoms to increase activity/position tolerance. Status at eval: 0%  Current: MET: 60% improvement  3. Innominates will remain aligned and stable to achieve other goals. Status at eval: right ant vs left post  Current: MET: innominates stable      Long Term Goals: To be accomplished in 4 weeks:  1. Increase gastroc flexibility by 10 degrees to normalize function. Status at eval: 0 degrees bilaterally  Current: No change however pt provided with elongated hamstring stretches for HEP to promote lengthening  2.  Pt will report 50% improvement in symptoms to increase activity/position tolerance. Status at eval: 0%  Current:  MET: reporting 90% improvement today  3. Increase bilateral hip strength for flex, ext and ER >/= 4+/5 to normalize function.   Status at eval: 4/5 bilaterally for all  Current:progressing however requires frequent cues for form during standing exercises        PLAN  [x]  Upgrade activities as tolerated     [x]  Continue plan of care  []  Update interventions per flow sheet       []  Discharge due to:_  []  Other:_      Virgil Dobbins PT 12/13/2017  10:25 AM    Future Appointments  Date Time Provider Kolby Ruiz   12/13/2017 10:30 AM Virgil Dobbins PT [facility]   12/18/2017 1:00 PM DEMETRICE Jimenes [facility]   12/20/2017 2:30 PM DEMETRICE Perez [facility]   12/27/2017 2:30 PM DEMETRICE Perez [facility]

## 2017-12-14 ENCOUNTER — APPOINTMENT (OUTPATIENT)
Dept: PHYSICAL THERAPY | Age: 47
End: 2017-12-14
Payer: COMMERCIAL

## 2017-12-15 ENCOUNTER — APPOINTMENT (OUTPATIENT)
Dept: PHYSICAL THERAPY | Age: 47
End: 2017-12-15
Payer: COMMERCIAL

## 2017-12-18 ENCOUNTER — HOSPITAL ENCOUNTER (OUTPATIENT)
Dept: PHYSICAL THERAPY | Age: 47
Discharge: HOME | End: 2017-12-18
Payer: COMMERCIAL

## 2017-12-18 PROCEDURE — 97112 NEUROMUSCULAR REEDUCATION: CPT

## 2017-12-18 PROCEDURE — 97110 THERAPEUTIC EXERCISES: CPT

## 2017-12-18 NOTE — PROGRESS NOTES
PT DAILY TREATMENT NOTE     Patient Name: Madeline Villavicencio  Date:2017  : 1970  [x]  Patient  Verified  Payor: BLUE CROSS / Plan: [facility] PPO / Product Type: PPO /    In time:1253  Out time:143  Total Treatment Time (min): 50  Visit #: 9 of 12    Treatment Area: Pain in right hip [M25.551]    SUBJECTIVE  Pain Level (0-10 scale):0/10  Any medication changes, allergies to medications, adverse drug reactions, diagnosis change, or new procedure performed?: [x] No    [] Yes (see summary sheet for update)  Subjective functional status/changes:   [] No changes reported  Pt reports she has improved since start of care with nearly 95% improvement, finishing up at the end of next week. She states that she added pilates and felt the burn but tolerated it. OBJECTIVE        15 min Therapeutic Exercise:  [] See flow sheet :   Rationale: increase ROM, increase strength, improve coordination and increase proprioception to improve the patients ability to ambulate community distances    35 min Neuromuscular Re-education:  [x]  See flow sheet :   Rationale: increase ROM, increase strength, improve coordination, improve balance and increase proprioception  to improve the patients ability to ambulate community distances          With   [x] TE   [] TA   [x] neuro   [] other: Patient Education: [x] Review HEP    [] Progressed/Changed HEP based on:   [] positioning   [] body mechanics   [] transfers   [] heat/ice application    [x] other:transitional exercise to gym     Other Objective/Functional Measures:      Pain Level (0-10 scale) post treatment:0/10    ASSESSMENT/Changes in Function: Pt tolerating increased challenge in PT and in gym on own,  She continues to require some cueing for form however demonstrates improving strength overall.     Patient will continue to benefit from skilled PT services to address functional mobility deficits, address ROM deficits, address strength deficits and analyze and address soft tissue restrictions to attain remaining goals. []  See Plan of Care  []  See progress note/recertification  []  See Discharge Summary         Progress towards goals / Updated goals:  Short Term Goals: To be accomplished in 2 weeks:  1. Patient will be independent and compliant with HEP to achieve other goals. Status at eval: issued and reviewed HEP  Current: pt reports compliance  2. Pt will report >/= 25% improvement in symptoms to increase activity/position tolerance. Status at eval: 0%  Current: MET: 60% improvement  3. Innominates will remain aligned and stable to achieve other goals. Status at eval: right ant vs left post  Current: MET: innominates stable      Long Term Goals: To be accomplished in 4 weeks:  1. Increase gastroc flexibility by 10 degrees to normalize function. Status at eval: 0 degrees bilaterally  Current: No change however pt provided with elongated hamstring stretches for HEP to promote lengthening  2. Pt will report 50% improvement in symptoms to increase activity/position tolerance. Status at eval: 0%  Current:  MET: reporting 90% improvement today  3. Increase bilateral hip strength for flex, ext and ER >/= 4+/5 to normalize function.   Status at eval: 4/5 bilaterally for all  Current:progressing however requires frequent cues for form during standing exercises        PLAN  []  Upgrade activities as tolerated     []  Continue plan of care  []  Update interventions per flow sheet       []  Discharge due to:_  []  Other:_      Veronica Alas PTA 12/18/2017  12:49 PM    Future Appointments  Date Time Provider Kolby Ruiz   12/18/2017 1:00 PM DEMETRICE King [facility]   12/20/2017 2:30 PM DEMETRICE Forbes [facility]   12/27/2017 10:45 AM DEMETRICE King [facility]   12/29/2017 3:00 PM DEMETRICE Forbes [facility]

## 2017-12-20 ENCOUNTER — HOSPITAL ENCOUNTER (OUTPATIENT)
Dept: PHYSICAL THERAPY | Age: 47
Discharge: HOME | End: 2017-12-20
Payer: COMMERCIAL

## 2017-12-20 PROCEDURE — 97112 NEUROMUSCULAR REEDUCATION: CPT

## 2017-12-20 PROCEDURE — 97110 THERAPEUTIC EXERCISES: CPT

## 2017-12-27 ENCOUNTER — HOSPITAL ENCOUNTER (OUTPATIENT)
Dept: PHYSICAL THERAPY | Age: 47
Discharge: HOME | End: 2017-12-27
Payer: COMMERCIAL

## 2017-12-27 PROCEDURE — 97112 NEUROMUSCULAR REEDUCATION: CPT

## 2017-12-27 NOTE — PROGRESS NOTES
[facility] at [address] [place] [address]  Phone: [phone]   Fax: [phone]    Discharge Summary    Patient name: Alicia Orosco     Start of Care: 2017  Referral source: Jacqui Serna MD    : 1970  Medical/Treatment Diagnosis: Pain in right hip [M25.551]  Onset Date:   Prior Hospitalization: see medical history   Provider#: [id number]  Comorbidities: fibromyalgia, arthritis, weight change of > 10lbs recently  Prior Level of Function:normal activity, no deficits  Medications: Verified on Patient Summary List    Visits from Start of Care: 11    Missed Visits: 0  Reporting Period : 2017 to 2017    Short Term Goals: To be accomplished in 2 weeks:  1. Patient will be independent and compliant with HEP to achieve other goals. Status at eval: issued and reviewed HEP  Current: MET:pt reports compliance  2. Pt will report >/= 25% improvement in symptoms to increase activity/position tolerance. Status at eval: 0%  Current: MET: 98% improvement  3. Innominates will remain aligned and stable to achieve other goals. Status at eval: right ant vs left post  Current: MET: innominates stable      Long Term Goals: To be accomplished in 4 weeks:  1. Increase gastroc flexibility by 10 degrees to normalize function. Status at eval: 0 degrees bilaterally  Current: NEARLY MET  2. Pt will report 50% improvement in symptoms to increase activity/position tolerance. Status at eval: 0%  Current:  MET: reporting 90% improvement today  3. Increase bilateral hip strength for flex, ext and ER >/= 4+/5 to normalize function. Status at eval: 4/5 bilaterally for all  Current:NEARLY MET        Assessment/ Summary of Care: Patient is ready for discharge at this time. Patient has met goals set and requested self-discharge as she is able to return to previous activities causing her pain such as classes through [facility]. No further skilled PT services indicated at this time. RECOMMENDATIONS:  [x]Discontinue therapy: [x]Patient has reached or is progressing toward set goals      [x]Patient is non-compliant or has abdicated      []Due to lack of appreciable progress towards set goals    Naresh Dennis 12/27/2017 6:21 PM

## 2017-12-27 NOTE — PROGRESS NOTES
PT DAILY TREATMENT NOTE     Patient Name: Lev Lozada  Date:2017  : 1970  [x]  Patient  Verified  Payor: BLUE CROSS / Plan: [facility] PPO / Product Type: PPO /    In time:1045  Out time:1125  Total Treatment Time (min): 40  Visit #: 11 of 12    Treatment Area: Pain in right hip [M25.551]    SUBJECTIVE  Pain Level (0-10 scale): 0/10  Any medication changes, allergies to medications, adverse drug reactions, diagnosis change, or new procedure performed?: [x] No    [] Yes (see summary sheet for update)  Subjective functional status/changes:   [] No changes reported  Pt reports that today will be her last day because she couldn't get one more in before the end of the year    OBJECTIVE        40 min Neuromuscular Re-education:  [x]  See flow sheet :   Rationale: increase ROM, increase strength, improve coordination, improve balance and increase proprioception  to improve the patients ability to ambulate community distances            With   [] TE   [] TA   [x] neuro   [] other: Patient Education: [x] Review HEP    [] Progressed/Changed HEP based on:   [] positioning   [] body mechanics   [] transfers   [x] heat/ice application    [x] other: progression with HEP     Other Objective/Functional Measures:      Pain Level (0-10 scale) post treatment: 0/10    ASSESSMENT/Changes in Function: Pt demonstrates improving strength and has met most goals. She has a plan in place that she has been practicing with a transition to a variety of classes at the [facility] to continue to build strength through her hips. Additionally she modifies in classes when necessary to avoid reinjuring herself. Pt to be discharged today.     Patient will continue to benefit from skilled PT services to address functional mobility deficits, address ROM deficits, address strength deficits, analyze and address soft tissue restrictions, analyze and cue movement patterns, analyze and modify body mechanics/ergonomics, assess and modify postural abnormalities and instruct in home and community integration to attain remaining goals. []  See Plan of Care  []  See progress note/recertification  []  See Discharge Summary         Progress towards goals / Updated goals:  Short Term Goals: To be accomplished in 2 weeks:  1. Patient will be independent and compliant with HEP to achieve other goals. Status at eval: issued and reviewed HEP  Current: MET:pt reports compliance  2. Pt will report >/= 25% improvement in symptoms to increase activity/position tolerance. Status at eval: 0%  Current: MET: 98% improvement  3. Innominates will remain aligned and stable to achieve other goals. Status at eval: right ant vs left post  Current: MET: innominates stable      Long Term Goals: To be accomplished in 4 weeks:  1. Increase gastroc flexibility by 10 degrees to normalize function. Status at eval: 0 degrees bilaterally  Current: NEARLY MET  2. Pt will report 50% improvement in symptoms to increase activity/position tolerance. Status at eval: 0%  Current:  MET: reporting 90% improvement today  3. Increase bilateral hip strength for flex, ext and ER >/= 4+/5 to normalize function. Status at eval: 4/5 bilaterally for all  Current:NEARLY MET           PLAN  []  Upgrade activities as tolerated     []  Continue plan of care  []  Update interventions per flow sheet       [x]  Discharge due to:_goals met  []  Other:_      Aguilar Roque, PTA 12/27/2017  11:10 AM    No future appointments.

## 2017-12-29 ENCOUNTER — APPOINTMENT (OUTPATIENT)
Dept: PHYSICAL THERAPY | Age: 47
End: 2017-12-29
Payer: COMMERCIAL

## 2019-10-04 ENCOUNTER — HOSPITAL ENCOUNTER (OUTPATIENT)
Dept: PHYSICAL THERAPY | Age: 49
Discharge: HOME | End: 2019-10-04
Payer: COMMERCIAL

## 2019-10-04 PROCEDURE — 97161 PT EVAL LOW COMPLEX 20 MIN: CPT

## 2019-10-04 PROCEDURE — 97110 THERAPEUTIC EXERCISES: CPT

## 2019-10-04 NOTE — PROGRESS NOTES
[facility] at [address] [place] [address] Drive: [phone]   Fax: [phone]  PLAN OF CARE / [address] [place] [address] FOR PHYSICAL THERAPY SERVICES  Patient Name: Michell Carrillo : 1970   Medical   Diagnosis: Neck pain [M54.2]  Left shoulder pain [M25.512] Treatment Diagnosis: Left shoulder impingement syndrome   Onset Date: 4 months ago     Referral Source: Jessica Waterman MD Start of Care [facility]): 10/4/2019   Prior Hospitalization: See medical history Provider #: [id number]   Prior Level of Function: active exerciser participating in multiple gym classes   Comorbidities: fibromyalgia   Medications: Verified on Patient Summary List   The Plan of Care and following information is based on the information from the initial evaluation.   ===========================================================================================  Assessment / dorsey information:  Michell Carrillo is a 44 y.o.  yo female presents with  signs/symtoms of left shoulder impingement syndrome. Patient exhibits decreased left shoulder AROM, strength and function with moderate pain with above shoulder tasks.     Patient will continue to benefit from skilled PT services to modify and progress therapeutic interventions, address functional mobility deficits, address ROM deficits, address strength deficits, analyze and address soft tissue restrictions, analyze and cue movement patterns, analyze and modify body mechanics/ergonomics and assess and modify postural abnormalities to attain remaining goals.   .    Pt instructed in HEP and will f/u in clinic for PT.  ===========================================================================================  Eval Complexity: History MEDIUM  Complexity : 1-2 comorbidities / personal factors will impact the outcome/ POC ;  Examination  LOW Complexity : 1-2 Standardized tests and measures addressing body structure, function, activity limitation and / or participation in recreation ; Presentation LOW Complexity : Stable, uncomplicated ;  Decision Making MEDIUM Complexity : FOTO score of 26-74; Overall Complexity LOW   Problem List: pain affecting function, decrease ROM, decrease strength, decrease ADL/ functional abilitiies, decrease activity tolerance and decrease flexibility/ joint mobility   Treatment Plan may include any combination of the following: Therapeutic exercise, Therapeutic activities, Neuromuscular re-education, Physical agent/modality, Manual therapy, Patient education, Self Care training and Functional mobility training  Patient / Family readiness to learn indicated by: asking questions, trying to perform skills and interest  Persons(s) to be included in education: patient (P)  Barriers to Learning/Limitations: no  Measures Taken: NA  Patient Goal (s): \"I want the pain to go away so I can participate in exercise classes without pain. \"   Patient self reported health status: good  Rehabilitation Potential: good  Goals:  Short Term Goals: To be accomplished in 4 weeks:   Patient will report compliance with HEP at least 1x/day to aid in rehabilitation program.   Status at IE: Instructed in and provided written copy of initial HEP. Provided green theraband for home use. Current: Same as IE     Patient will display full left shoulder pain free AROM into flexion and abduction to aid in completion of ADLs. Status at IE: left shoulder flexion 127, abduction 67   Current: Same as IE      Long Term Goals: To be accomplished in 8 weeks:   Patient will report compliance with HEP a least 3-4x/week to aid in rehabilitation/strengthening program.   Status at IE:  Instructed in and provided written copy of initial HEP. Provided green theraband for home use. Current: Same as IE     Patient will report no sharp pain with left shoulder abduction to aid in completion of ADLs. Status at IE:  Sharp pain left shoulder with abduction beyond 70 degrees.    Current: Same as IE     Patient will increase left UE strength to 5/5 throughout all planes to aid in completion of ADLs. Status at IE: left shoulder flexion, abduction, external rotation 4/5   Current: Same as IE     Patient will increase FOTO score to 70 points overall to demonstrate improvement in functional status. Status at IE: FOTO 60/100   Current: Same as IE        Frequency / Duration:   Patient to be seen 2  times per week for 8  weeks:  Patient / Caregiver education and instruction: self care and exercises      . Therapist Signature: Bridger Clark DPT Date: 42/1/8618   Certification Period: NA Time: 8:58 AM   ===========================================================================================  I certify that the above Physical Therapy Services are being furnished while the patient is under my care. I agree with the treatment plan and certify that this therapy is necessary. Physician Signature:        Date:       Time:     Please sign and return to [facility] or you may fax the signed copy to [phone]. Thank you.

## 2019-10-04 NOTE — PROGRESS NOTES
PT DAILY TREATMENT NOTE/SHOULDER EVAL 10-18    Patient Name: Librado Larios  Date:10/4/2019  : 1970  [x]  Patient  Verified  Payor: BLUE CROSS / Plan: [facility] PPO / Product Type: PPO /    In time:800  Out time:855  Total Treatment Time (min): 30  Visit #: 1 of 16    Medicare/BCBS Only   Total Timed Codes (min):  30 1:1 Treatment Time:  30       Treatment Area: Neck pain [M54.2]  Left shoulder pain [M25.512]    SUBJECTIVE  Pain Level (0-10 scale): 1  []constant []intermittent []improving [x]worsening []no change since onset    Any medication changes, allergies to medications, adverse drug reactions, diagnosis change, or new procedure performed?: [x] No    [] Yes (see summary sheet for update)  Subjective functional status/changes:     Patient has c/c of left shoulder pain past four months with no apparent reason for onset. However, patient has noted pain worsening as she participates in exercise classes with frequent above shoulder motions. Patient describes pain as constant ache in left deltoid region. Pain is worse in PM. Denies numbness/tingling. Denies popping/clicking. Aggravating factors: pain worst with above shoulder motions. Alleviating factors: limiting above shoulder motions. Denies red flags: SOB, chest pain, dizziness/lightheadedness, blurred/double vision, HA, chills/fevers, night sweats, change in bowel/bladder control, abdominal pain, difficulty swallowing, slurred speech, unexplained weight gain/loss, nausea, vomiting. PMHx: fibromyalgia. Surgical Hx: appendectomy, gastric bypass, cholecystectomy. Social Hx: , two story home, no alcohol, no tobacco, retired autism therapist. PLOF: active exerciser participating in multiple gym classes. CLOF: Limited in ability to participate in exercise classes, avoiding above shoulder motions. Diagnostic Imaging: x-rays negative.       OBJECTIVE/EXAMINATION    25 min [x]Eval                  []Re-Eval       30 min Therapeutic Exercise:  [x] See flow sheet :   Rationale: increase ROM, increase strength and decrease pain to improve the patients ability to complete ADLs            With   [] TE   [] TA   [] neuro   [] other: Patient Education: [x] Review HEP    [] Progressed/Changed HEP based on:   [] positioning   [] body mechanics   [] transfers   [] heat/ice application    [] other:        Physical Therapy Evaluation - Shoulder    Posture: [] Poor    [x] Fair    [] Good    Describe: slightly rounded shoulders    ROM:  [] Unable to assess at this time                                           AROM                                                          Left Right   Flexion 127 165   Extension 51 65   Scaption/ABD 67 167   ER @ 0 Degrees 61 73   ER @ 90 Degrees     IR @ 90 Degrees 62 68     End Feel / Painful Arc: pronounced painful arc left shoulder 90 to 110 degrees. UE Strength:   [] Unable to assess at this time                                                                            L (1-5) R (1-5) Pain   Flexion 4 5 [] Yes   [x] No   Abduction 4 5 [] Yes   [x] No   ER 4 5 [] Yes   [x] No   IR 5 5 [] Yes   [x] No   Extension 5 5 [] Yes   [x] No       Scapulohumoral Control / Rhythm:  Able to eccentrically lower with good control?  Left: [] Yes   [x] No     Right: [x] Yes   [] No    Accessory Motions:    Palpation: No tenderness to palpation left shoulder    Adson's Test  [] Pos   [x] Neg Yergason's Test [] Pos   [x] Neg  Alyssa's Test  [] Pos   [x] Neg [place]'s Sign [] Pos   [] Neg  Neer's Test  [] Pos   [x] Neg Clunk Test  [] Pos   [x] Neg  Hawkin's Test  [] Pos   [x] Neg AC Joint  [] Pos   [x] Neg  Speed's Test  [] Pos   [x] Neg SC Joint  [] Pos   [x] Neg  Empty Can  [x] Pos   [] Neg Pectoral Tightness [x] Pos   [] Neg  Anterior Apprehension [] Pos   [x] Neg   Posterior Apprehension [] Pos   [x] Neg      Other Tests / Comments:        Pain Level (0-10 scale) post treatment: 1    ASSESSMENT/Changes in Function: Patient presents with signs/symtoms of left shoulder impingement syndrome. Patient exhibits decreased left shoulder AROM, strength and function with moderate pain with above shoulder tasks. Patient will continue to benefit from skilled PT services to modify and progress therapeutic interventions, address functional mobility deficits, address ROM deficits, address strength deficits, analyze and address soft tissue restrictions, analyze and cue movement patterns, analyze and modify body mechanics/ergonomics and assess and modify postural abnormalities to attain remaining goals.      [x]  See Plan of Care  []  See progress note/recertification  []  See Discharge Summary         Progress towards goals / Updated goals:  See POC    PLAN  []  Upgrade activities as tolerated     [x]  Continue plan of care  []  Update interventions per flow sheet       []  Discharge due to:_  []  Other:_      Francisco Garcia PT 10/4/2019  8:05 AM

## 2019-10-08 ENCOUNTER — APPOINTMENT (OUTPATIENT)
Dept: PHYSICAL THERAPY | Age: 49
End: 2019-10-08
Payer: COMMERCIAL

## 2019-10-11 ENCOUNTER — HOSPITAL ENCOUNTER (OUTPATIENT)
Dept: PHYSICAL THERAPY | Age: 49
Discharge: HOME | End: 2019-10-11
Payer: COMMERCIAL

## 2019-10-11 PROCEDURE — 97110 THERAPEUTIC EXERCISES: CPT

## 2019-10-11 NOTE — PROGRESS NOTES
PT DAILY TREATMENT NOTE    Patient Name: Kermit Cherry  Date:10/11/2019  : 1970  [x]  Patient  Verified   Payor: BLUE SOWMYA / Plan: [facility] [id number] PPO / Product Type: PPO /    In time:10:  Out time:11:09  Total Treatment Time (min): 38  Total Timed Codes (min): 38  1:1 Treatment Time (MC only): 38   Visit #: 2 of 16    Treatment Area: Neck pain [M54.2]  Left shoulder pain [M25.512]    SUBJECTIVE  Pain Level (0-10 scale): 0/10  Any medication changes, allergies to medications, adverse drug reactions, diagnosis change, or new procedure performed?: [x] No    [] Yes (see summary sheet for update)  Subjective functional status/changes:   [] No changes reported  \"I don't have any pain right now. I usually have pain when I get all the way over my head. \"    OBJECTIVE      38 min Therapeutic Exercise:  [x] See flow sheet :   Rationale: increase ROM, increase strength and improve coordination to improve the patients ability to return to prior level of physical activity. With   [] TE   [] TA   [] neuro   [] other: Patient Education: [x] Review HEP    [] Progressed/Changed HEP based on:   [] positioning   [] body mechanics   [] transfers   [] heat/ice application    [] other:      Other Objective/Functional Measures:      Pain Level (0-10 scale) post treatment: 0/10    ASSESSMENT/Changes in Function: Pt tolerated session well. Pt reported slight discomfort with end ranges for flex and ext but no pain increases. Pt demonstrated no adverse affects post tx. Patient will continue to benefit from skilled PT services to modify and progress therapeutic interventions, address functional mobility deficits, address ROM deficits, address strength deficits, analyze and address soft tissue restrictions, analyze and cue movement patterns, analyze and modify body mechanics/ergonomics and assess and modify postural abnormalities to attain remaining goals.      []  See Plan of Care  []  See progress note/recertification  []  See Discharge Summary         Progress towards goals / Updated goals:  Short Term Goals: To be accomplished in 4 weeks:                   Patient will report compliance with HEP at least 1x/day to aid in rehabilitation program.                   Status at IE: Instructed in and provided written copy of initial HEP. Provided green theraband for home use. Current: Pt reports compliance of therex 3-4 times a week at this time 10/11/19                      Patient will display full left shoulder pain free AROM into flexion and abduction to aid in completion of ADLs. Status at IE: left shoulder flexion 127, abduction 67                   Current: Same as IE        Long Term Goals: To be accomplished in 8 weeks:                   Patient will report compliance with HEP a least 3-4x/week to aid in rehabilitation/strengthening program.                   Status at IE:  Instructed in and provided written copy of initial HEP. Provided green theraband for home use. Current: Same as IE                      Patient will report no sharp pain with left shoulder abduction to aid in completion of ADLs. Status at IE:  Sharp pain left shoulder with abduction beyond 70 degrees. Current: Same as IE                      Patient will increase left UE strength to 5/5 throughout all planes to aid in completion of ADLs. Status at IE: left shoulder flexion, abduction, external rotation 4/5                   Current: Same as IE                      Patient will increase FOTO score to 70 points overall to demonstrate improvement in functional status.                     Status at IE: FOTO 60/100                   Current: Same as IE       PLAN  [x]  Upgrade activities as tolerated     [x]  Continue plan of care  []  Update interventions per flow sheet       []  Discharge due to:_  []  Other:_      Deb Tsai PTA 10/11/2019  10:50 AM    Future Appointments   Date Time Provider Kolby Ruiz   10/15/2019  4:00 PM Mursilvia Calles, PT [facility]   10/18/2019  1:30 PM Denzel Kang, PT [facility]   10/22/2019  3:30 PM Murbustere Calles, PT [facility]   10/25/2019  3:30 PM Murlene Calles, PT [facility]   10/29/2019  3:30 PM Murbustere Calles, PT [facility]   11/1/2019  3:30 PM Murlene Calles, PT [facility]   11/12/2019  9:00 AM Mursilvia Callse, PT [facility]   11/15/2019  3:30 PM Ave Watson [facility]

## 2019-10-15 ENCOUNTER — HOSPITAL ENCOUNTER (OUTPATIENT)
Dept: PHYSICAL THERAPY | Age: 49
Discharge: HOME | End: 2019-10-15
Payer: COMMERCIAL

## 2019-10-15 PROCEDURE — 97110 THERAPEUTIC EXERCISES: CPT

## 2019-10-15 NOTE — PROGRESS NOTES
PT DAILY TREATMENT NOTE    Patient Name: Silva Owen  Date:10/15/2019  : 1970  [x]  Patient  Verified  Payor: Florencio Lorenzo / Plan: [facility] [id number] PPO / Product Type: PPO /    In time: 400  Out time: 444  Total Treatment Time (min): 44  Total Timed Codes (min): 39  1:1 Treatment Time ( only): 39   Visit #: 3 of 16    Treatment Area: Neck pain [M54.2]  Left shoulder pain [M25.512]    SUBJECTIVE  Pain Level (0-10 scale): 0  Any medication changes, allergies to medications, adverse drug reactions, diagnosis change, or new procedure performed?: [x] No    [] Yes (see summary sheet for update)  Subjective functional status/changes:   [] No changes reported  \"I have been doing the exercises you taught me faithfully and I have had no shoulder pain since I started doing the exercises every day. \"    OBJECTIVE    34 min Therapeutic Exercise:  [x] See flow sheet :   Rationale: increase ROM, increase strength and decrease pain to improve the patients ability to complete ADLs    5 min Manual Therapy:  Supine left shoulder joint mobilizations. Rationale: decrease pain and increase ROM to improve the patients ability to complete ADLS     With   [] TE   [] TA   [] neuro   [] other: Patient Education: [x] Review HEP    [] Progressed/Changed HEP based on:   [] positioning   [] body mechanics   [] transfers   [] heat/ice application    [] other:      Other Objective/Functional Measures: NA     Pain Level (0-10 scale) post treatment: 0    ASSESSMENT/Changes in Function: Patient responds well to treatment session. Progressed today from gentle ROM exs to basic strengthening exs with good tolerance. But, patient fatigues quickly with strengthening exercises indicating further conditioning is indicated. . No adverse effects were noted from today's treatment session.      Patient will continue to benefit from skilled PT services to modify and progress therapeutic interventions, address functional mobility deficits, address ROM deficits, address strength deficits, analyze and address soft tissue restrictions, analyze and cue movement patterns, analyze and modify body mechanics/ergonomics, assess and modify postural abnormalities,  and instruct in home and community integration to attain remaining goals. []  See Plan of Care  []  See progress note/recertification  []  See Discharge Summary         Progress towards goals / Updated goals:  Short Term Goals: To be accomplished in 4 weeks:                   LKEAVQP will report compliance with HEP at least 1x/day to aid in rehabilitation program.                   Status at IE: Instructed in and provided written copy of initial HEP. Provided green theraband for home use.                   Current: Pt reports compliance of therex 3-4 times a week at this time 10/11/19                      Patient will display full left shoulder pain free AROM into flexion and abduction to aid in completion of ADLs.                  Status at IE: left shoulder flexion 127, abduction 67                   Current: Same as IE        Long Term Goals: To be accomplished in 8 weeks:                   Patient will report compliance with HEP a least 3-4x/week to aid in rehabilitation/strengthening program.                   Status at IE:  Instructed in and provided written copy of initial HEP. Provided green theraband for home use.                   Current: Same as IE                      Patient will report no sharp pain with left shoulder abduction to aid in completion of ADLs.                  Status at IE:  Sharp pain left shoulder with abduction beyond 70 degrees.                  Current: Same as IE                      Patient will increase left UE strength to 5/5 throughout all planes to aid in completion of ADLs.                    Status at IE: left shoulder flexion, abduction, external rotation 4/5                   Current: Same as IE                      Patient will increase FOTO score to 70 points overall to demonstrate improvement in functional status.                     Status at IE: FOTO 60/100                   Current: Same as IE    PLAN  []  Upgrade activities as tolerated     [x]  Continue plan of care  []  Update interventions per flow sheet       []  Discharge due to:_  []  Other:_      ePggy Funk, PT, DPT 10/15/2019  4:21 PM    Future Appointments   Date Time Provider Kolby Ruiz   10/18/2019  1:30 PM Vickie Jernigan [facility]   10/22/2019  3:30 PM Bimal Watson, [facility]AR[facility]   10/25/2019  3:30 PM GILSON Baltazar [facility]   10/29/2019  3:30 PM GILSON Baltazar [facility]   11/1/2019  3:30 PM GILSON Baltazar [facility]   11/12/2019  9:00 AM GILSON Baltazar [facility]   11/15/2019  3:30 PM Patricio Watson [facility]

## 2019-10-18 ENCOUNTER — HOSPITAL ENCOUNTER (OUTPATIENT)
Dept: PHYSICAL THERAPY | Age: 49
Discharge: HOME | End: 2019-10-18
Payer: COMMERCIAL

## 2019-10-18 PROCEDURE — 97110 THERAPEUTIC EXERCISES: CPT

## 2019-10-18 NOTE — PROGRESS NOTES
PT DAILY TREATMENT NOTE    Patient Name: Jarek Jules  Date:10/18/2019  : 1970  [x]  Patient  Verified  Payor: BLUE CROSS / Plan: [facility] [id number] PPO / Product Type: PPO /    In time: 130  Out time: 215  Total Treatment Time (min): 45  Total Timed Codes (min): 40  1:1 Treatment Time (MC only): 40   Visit #: 4 of 16    Treatment Area: Neck pain [M54.2]  Left shoulder pain [M25.512]    SUBJECTIVE  Pain Level (0-10 scale): 0  Any medication changes, allergies to medications, adverse drug reactions, diagnosis change, or new procedure performed?: [x] No    [] Yes (see summary sheet for update)  Subjective functional status/changes:   [] No changes reported  \"The shoulder has been feeling very good. I am not having any pain at all now. \"    OBJECTIVE    40 min Therapeutic Exercise:  [x] See flow sheet :   Rationale: increase ROM, increase strength and decrease pain to improve the patients ability to complete ADLs     With   [] TE   [] TA   [] neuro   [] other: Patient Education: [x] Review HEP    [] Progressed/Changed HEP based on:   [] positioning   [] body mechanics   [] transfers   [] heat/ice application    [] other:      Other Objective/Functional Measures: NA     Pain Level (0-10 scale) post treatment: 0    ASSESSMENT/Changes in Function: Patient responds well to treatment session. Progressing treatment focus to increasing functional strength and endurance as pain symptoms are now minimal. No adverse effects were noted from today's treatment session. Patient will continue to benefit from skilled PT services to modify and progress therapeutic interventions, address functional mobility deficits, address ROM deficits, address strength deficits, analyze and address soft tissue restrictions, analyze and cue movement patterns, analyze and modify body mechanics/ergonomics, assess and modify postural abnormalities,  and instruct in home and community integration to attain remaining goals.     [] See Plan of Care  []  See progress note/recertification  []  See Discharge Summary         Progress towards goals / Updated goals:  Short Term Goals: To be accomplished in 4 weeks:                   MXPCHQZ will report compliance with HEP at least 1x/day to aid in rehabilitation program.                   Status at IE: Instructed in and provided written copy of initial HEP. Provided green theraband for home use.                   Current: Pt reports compliance of therex 3-4 times a week at this time 10/11/19                      Patient will display full left shoulder pain free AROM into flexion and abduction to aid in completion of ADLs.                  Status at IE: left shoulder flexion 127, abduction 67                   Current: AROM left shoulder flexion 140, abduction 135 pain free. 10/18/19        Long Term Goals: To be accomplished in 8 weeks:                   Patient will report compliance with HEP a least 3-4x/week to aid in rehabilitation/strengthening program.                   Status at IE:  Instructed in and provided written copy of initial HEP. Provided green theraband for home use.                   Current: Same as IE                      Patient will report no sharp pain with left shoulder abduction to aid in completion of ADLs.                  Status at IE:  Sharp pain left shoulder with abduction beyond 70 degrees.                  Current: Same as IE                      Patient will increase left UE strength to 5/5 throughout all planes to aid in completion of ADLs.                  Status at IE: left shoulder flexion, abduction, external rotation 4/5                   Current: Same as IE                      Patient will increase FOTO score to 70 points overall to demonstrate improvement in functional status.                     Status at IE: FOTO 60/100                   Current: Same as IE    PLAN  []  Upgrade activities as tolerated     [x]  Continue plan of care  []  Update interventions per flow sheet       []  Discharge due to:_  []  Other:_      Peggy Funk, PT, DPT 10/18/2019  1:36 PM    Future Appointments   Date Time Provider Kolby Ruiz   10/22/2019  3:30 PM GILSON Baltazar [facility]   10/25/2019  3:30 PM Bimal Watson, GILSON GALARZA [facility]   10/29/2019  3:30 PM [facility]   11/1/2019  3:30 PM GILSON Baltazar [facility]   11/12/2019  9:00 AM [facility]   11/15/2019  3:30 PM Patricio Watson [facility]

## 2019-10-22 ENCOUNTER — HOSPITAL ENCOUNTER (OUTPATIENT)
Dept: PHYSICAL THERAPY | Age: 49
Discharge: HOME | End: 2019-10-22
Payer: COMMERCIAL

## 2019-10-22 PROCEDURE — 97110 THERAPEUTIC EXERCISES: CPT

## 2019-10-22 NOTE — PROGRESS NOTES
PT DAILY TREATMENT NOTE    Patient Name: Hien Chakraborty  Date:10/22/2019  : 1970  [x]  Patient  Verified  Payor: BLUE CROSS / Plan: Jerome [facility] [id number] PPO / Product Type: PPO /    In time: 330  Out time: 417  Total Treatment Time (min): 47  Total Timed Codes (min): 42  1:1 Treatment Time ( only): 42   Visit #: 5 of 16    Treatment Area: Neck pain [M54.2]  Left shoulder pain [M25.512]    SUBJECTIVE  Pain Level (0-10 scale): 0  Any medication changes, allergies to medications, adverse drug reactions, diagnosis change, or new procedure performed?: [x] No    [] Yes (see summary sheet for update)  Subjective functional status/changes:   [] No changes reported  \"I can tell I am getting stronger, but it seems I still have a ways to go. \"    OBJECTIVE    42 min Therapeutic Exercise:  [x] See flow sheet :   Rationale: increase ROM, increase strength and decrease pain to improve the patients ability to complete ADLs     With   [] TE   [] TA   [] neuro   [] other: Patient Education: [x] Review HEP    [] Progressed/Changed HEP based on:   [] positioning   [] body mechanics   [] transfers   [] heat/ice application    [] other:      Other Objective/Functional Measures: NA     Pain Level (0-10 scale) post treatment: 0    ASSESSMENT/Changes in Function: Patient responds well to treatment session. Left shoulder strength gradually improving. No adverse effects were noted from today's treatment session. Patient will continue to benefit from skilled PT services to modify and progress therapeutic interventions, address functional mobility deficits, address ROM deficits, address strength deficits, analyze and address soft tissue restrictions, analyze and cue movement patterns, analyze and modify body mechanics/ergonomics, assess and modify postural abnormalities,  and instruct in home and community integration to attain remaining goals.     []  See Plan of Care  []  See progress note/recertification  []  See Discharge Summary         Progress towards goals / Updated goals:  Short Term Goals: To be accomplished in 4 weeks:                   JKWRNEV will report compliance with HEP at least 1x/day to aid in rehabilitation program.                   Status at IE: Instructed in and provided written copy of initial HEP. Provided green theraband for home use.                   Current: Pt reports compliance of therex 3-4 times a week at this time 10/11/19                      Patient will display full left shoulder pain free AROM into flexion and abduction to aid in completion of ADLs.                  Status at IE: left shoulder flexion 127, abduction 67                   Current: AROM left shoulder flexion 140, abduction 135 pain free. 10/18/19        Long Term Goals: To be accomplished in 8 weeks:                   Patient will report compliance with HEP a least 3-4x/week to aid in rehabilitation/strengthening program.                   Status at IE:  Instructed in and provided written copy of initial HEP. Provided green theraband for home use.                   Current: Same as IE                      Patient will report no sharp pain with left shoulder abduction to aid in completion of ADLs.                  Status at IE:  Sharp pain left shoulder with abduction beyond 70 degrees.                  Current: No sharp pain with left shoulder abduction through full range. 10/22/19  MET                      Patient will increase left UE strength to 5/5 throughout all planes to aid in completion of ADLs.                  Status at IE: left shoulder flexion, abduction, external rotation 4/5                   Current: Same as IE                      Patient will increase FOTO score to 70 points overall to demonstrate improvement in functional status.                     Status at IE: FOTO 60/100                   Current: Same as IE    PLAN  []  Upgrade activities as tolerated     [x]  Continue plan of care  []  Update interventions per flow sheet       []  Discharge due to:_  []  Other:_      Peggy Funk, PT, DPT 10/22/2019  3:39 PM    Future Appointments   Date Time Provider Kolby Ruiz   10/25/2019  3:30 PM GILSON Baltazar [facility]   10/29/2019  3:30 PM CoxMarshal Jeans, PT [facility]   11/1/2019  3:30 PM GILSON Baltazar [facility]   11/12/2019  9:00 AM GILSON Baltazar [facility]   11/15/2019  3:30 PM Patricio Watson [facility]

## 2019-10-25 ENCOUNTER — HOSPITAL ENCOUNTER (OUTPATIENT)
Dept: PHYSICAL THERAPY | Age: 49
Discharge: HOME | End: 2019-10-25
Payer: COMMERCIAL

## 2019-10-25 PROCEDURE — 97110 THERAPEUTIC EXERCISES: CPT

## 2019-10-25 NOTE — PROGRESS NOTES
PT DAILY TREATMENT NOTE    Patient Name: Silva Owen  Date:10/25/2019  : 1970  [x]  Patient  Verified  Payor: BLUE SOWMYA / Plan: Jerome Garcia [id number] PPO / Product Type: PPO /    In time: 335  Out time: 422  Total Treatment Time (min): 47  Total Timed Codes (min): 42  1:1 Treatment Time ( only): 42   Visit #: 6 of 16    Treatment Area: Neck pain [M54.2]  Left shoulder pain [M25.512]    SUBJECTIVE  Pain Level (0-10 scale): 0  Any medication changes, allergies to medications, adverse drug reactions, diagnosis change, or new procedure performed?: [x] No    [] Yes (see summary sheet for update)  Subjective functional status/changes:   [] No changes reported  \"I am back to being able to tolerate using some arm weights in my exercise classes now. They are light weights, but it is definitely and improvement. \"    OBJECTIVE    42 min Therapeutic Exercise:  [x] See flow sheet :   Rationale: increase ROM, increase strength and decrease pain to improve the patients ability to complete ADLs    With   [] TE   [] TA   [] neuro   [] other: Patient Education: [x] Review HEP    [] Progressed/Changed HEP based on:   [] positioning   [] body mechanics   [] transfers   [] heat/ice application    [] other:      Other Objective/Functional Measures: NA     Pain Level (0-10 scale) post treatment: 0    ASSESSMENT/Changes in Function: Patient responds well to treatment session. Patient noting increased functional strength left UE during ADLs, including tolerance for weights during exercise classes. No adverse effects were noted from today's treatment session.      Patient will continue to benefit from skilled PT services to modify and progress therapeutic interventions, address functional mobility deficits, address ROM deficits, address strength deficits, analyze and address soft tissue restrictions, analyze and cue movement patterns, analyze and modify body mechanics/ergonomics, assess and modify postural abnormalities, and instruct in home and community integration to attain remaining goals. []  See Plan of Care  []  See progress note/recertification  []  See Discharge Summary         Progress towards goals / Updated goals:  Short Term Goals: To be accomplished in 4 weeks:                   [id number] will report compliance with HEP at least 1x/day to aid in rehabilitation program.                   Status at IE: Instructed in and provided written copy of initial HEP. Provided green theraband for home use.                   Current: Pt reports compliance of therex 3-4 times a week at this time 10/11/19                      Patient will display full left shoulder pain free AROM into flexion and abduction to aid in completion of ADLs.                  Status at IE: left shoulder flexion 127, abduction 67                   Current: AROM left shoulder flexion 140, abduction 135 pain free.  10/18/19        Long Term Goals: To be accomplished in 8 weeks:                   Patient will report compliance with HEP a least 3-4x/week to aid in rehabilitation/strengthening program.                   Status at IE:  Instructed in and provided written copy of initial HEP. Provided green theraband for home use.                   Current: Same as IE                      Patient will report no sharp pain with left shoulder abduction to aid in completion of ADLs.                  Status at IE:  Sharp pain left shoulder with abduction beyond 70 degrees.                  Current: No sharp pain with left shoulder abduction through full range. 10/22/19  MET                      Patient will increase left UE strength to 5/5 throughout all planes to aid in completion of ADLs.                  Status at IE: left shoulder flexion, abduction, external rotation 4/5                   Current: Same as IE                      Patient will increase FOTO score to 70 points overall to demonstrate improvement in functional status.                     Status at IE:  FOTO 60/100                   Current: IOYP 92    10/25/19    PLAN  []  Upgrade activities as tolerated     [x]  Continue plan of care  []  Update interventions per flow sheet       []  Discharge due to:_  []  Other:_      Augustina Tena PT, DPT 10/25/2019  3:46 PM    Future Appointments   Date Time Provider Kolby Ruiz   10/29/2019  3:30 PM GILSON Manuel [facility]   11/1/2019  3:30 PM GILSON Manuel [facility]   11/12/2019  3:30 PM GILSON Son [facility]   11/15/2019  3:30 PM Anna Watson [facility]

## 2019-10-29 ENCOUNTER — HOSPITAL ENCOUNTER (OUTPATIENT)
Dept: PHYSICAL THERAPY | Age: 49
Discharge: HOME | End: 2019-10-29
Payer: COMMERCIAL

## 2019-10-29 PROCEDURE — 97110 THERAPEUTIC EXERCISES: CPT

## 2019-10-29 NOTE — PROGRESS NOTES
PT DAILY TREATMENT NOTE    Patient Name: Meghan Pac  Date:10/29/2019  : 1970  [x]  Patient  Verified  Payor: Andres Cain / Plan: Jerome Garcia [id number] PPO / Product Type: PPO /    In time: 330  Out time: 415  Total Treatment Time (min): 45  Total Timed Codes (min): 40  1:1 Treatment Time ( only): 40   Visit #: 7 of 16    Treatment Area: Neck pain [M54.2]  Left shoulder pain [M25.512]    SUBJECTIVE  Pain Level (0-10 scale): 0  Any medication changes, allergies to medications, adverse drug reactions, diagnosis change, or new procedure performed?: [x] No    [] Yes (see summary sheet for update)  Subjective functional status/changes:   [] No changes reported  \"It is feeling good. I can tell though that I still need to get stronger. \"    OBJECTIVE    40 min Therapeutic Exercise:  [x] See flow sheet :   Rationale: increase ROM, increase strength and decrease pain to improve the patients ability to complete ADLs    With   [] TE   [] TA   [] neuro   [] other: Patient Education: [x] Review HEP    [] Progressed/Changed HEP based on:   [] positioning   [] body mechanics   [] transfers   [] heat/ice application    [] other:      Other Objective/Functional Measures: NA     Pain Level (0-10 scale) post treatment: 0    ASSESSMENT/Changes in Function: Patient responds well to treatment session. Continuing to tolerate gradual progression in exercise intensity without any recurrence of symptoms. No adverse effects were noted from today's treatment session. Patient will continue to benefit from skilled PT services to modify and progress therapeutic interventions, address functional mobility deficits, address ROM deficits, address strength deficits, analyze and address soft tissue restrictions, analyze and cue movement patterns, analyze and modify body mechanics/ergonomics, assess and modify postural abnormalities,  and instruct in home and community integration to attain remaining goals.     []  See Plan of Care  []  See progress note/recertification  []  See Discharge Summary         Progress towards goals / Updated goals:  Short Term Goals: To be accomplished in 4 weeks:                   [id number] will report compliance with HEP at least 1x/day to aid in rehabilitation program.                   Status at IE: Instructed in and provided written copy of initial HEP. Provided green theraband for home use.                   Current: Pt reports compliance of therex 3-4 times a week at this time 10/11/19                      Patient will display full left shoulder pain free AROM into flexion and abduction to aid in completion of ADLs.                  Status at IE: left shoulder flexion 127, abduction 67                   Current: AROM left shoulder flexion 140, abduction 135 pain free.  10/18/19        Long Term Goals: To be accomplished in 8 weeks:                   Patient will report compliance with HEP a least 3-4x/week to aid in rehabilitation/strengthening program.                   Status at IE:  Instructed in and provided written copy of initial HEP. Provided green theraband for home use.                   Current: Same as IE                      Patient will report no sharp pain with left shoulder abduction to aid in completion of ADLs.                  Status at IE:  Sharp pain left shoulder with abduction beyond 70 degrees.                  Current: No sharp pain with left shoulder abduction through full range. 10/22/19  MET                      Patient will increase left UE strength to 5/5 throughout all planes to aid in completion of ADLs.                  Status at IE: left shoulder flexion, abduction, external rotation 4/5                   Current: Same as IE                      Patient will increase FOTO score to 70 points overall to demonstrate improvement in functional status.                     Status at IE: FOTO 60/100                   Current: FOTO 64    10/25/19    PLAN  []  Upgrade activities as tolerated     [x]  Continue plan of care  []  Update interventions per flow sheet       []  Discharge due to:_  []  Other:_      Tiffanie Trejo PT, DPT 10/29/2019  3:47 PM    Future Appointments   Date Time Provider Kolby Ruiz   11/1/2019  3:30 PM GILSON Harrison [facility]   11/12/2019  9:00 AM GILSON Harrison [facility]   11/15/2019  3:30 PM Katy Watson [facility]

## 2019-11-01 ENCOUNTER — HOSPITAL ENCOUNTER (OUTPATIENT)
Dept: PHYSICAL THERAPY | Age: 49
Discharge: HOME | End: 2019-11-01
Payer: COMMERCIAL

## 2019-11-01 PROCEDURE — 97110 THERAPEUTIC EXERCISES: CPT

## 2019-11-01 NOTE — PROGRESS NOTES
PT DAILY TREATMENT NOTE    Patient Name: Iram Bravo  Date:2019  : 1970  [x]  Patient  Verified  Payor: BLUE CROSS / Plan: Jerome Garcia [id number] PPO / Product Type: PPO /    In time: 333  Out time: 413  Total Treatment Time (min): 40  Total Timed Codes (min): 40  1:1 Treatment Time (MC only): 40   Visit #: 8 of 16    Treatment Area: Neck pain [M54.2]  Left shoulder pain [M25.512]    SUBJECTIVE  Pain Level (0-10 scale): 0  Any medication changes, allergies to medications, adverse drug reactions, diagnosis change, or new procedure performed?: [x] No    [] Yes (see summary sheet for update)  Subjective functional status/changes:   [] No changes reported  \"I can tell that I am getting more endurance. The shoulder is able to tolerate exercise classes better. \"    OBJECTIVE    40 min Therapeutic Exercise:  [x] See flow sheet :   Rationale: increase ROM, increase strength and decrease pain to improve the patients ability to complete ADLs    With   [] TE   [] TA   [] neuro   [] other: Patient Education: [x] Review HEP    [] Progressed/Changed HEP based on:   [] positioning   [] body mechanics   [] transfers   [] heat/ice application    [] other:      Other Objective/Functional Measures: NA     Pain Level (0-10 scale) post treatment: 0    ASSESSMENT/Changes in Function: Patient responds well to treatment session. Patient continues to note gradual improvements in shoulder tolerance for functional activities. No adverse effects were noted from today's treatment session.      Patient will continue to benefit from skilled PT services to modify and progress therapeutic interventions, address functional mobility deficits, address ROM deficits, address strength deficits, analyze and address soft tissue restrictions, analyze and cue movement patterns, analyze and modify body mechanics/ergonomics, assess and modify postural abnormalities,  and instruct in home and community integration to attain remaining goals.    []  See Plan of Care  []  See progress note/recertification  []  See Discharge Summary         Progress towards goals / Updated goals:  Short Term Goals: To be accomplished in 4 weeks:                   PDURZRD will report compliance with HEP at least 1x/day to aid in rehabilitation program.                   Status at IE: Instructed in and provided written copy of initial HEP. Provided green theraband for home use.                   Current: Pt reports compliance of therex 3-4 times a week at this time 10/11/19                      Patient will display full left shoulder pain free AROM into flexion and abduction to aid in completion of ADLs.                  Status at IE: left shoulder flexion 127, abduction 67                   Current: AROM left shoulder flexion 152, abduction 161 pain free.  11/1/19  MET        Long Term Goals: To be accomplished in 8 weeks:                   Patient will report compliance with HEP a least 3-4x/week to aid in rehabilitation/strengthening program.                   Status at IE:  Instructed in and provided written copy of initial HEP. Provided green theraband for home use.                   Current: Patient consistent with HEP and also regularly participating in exercise classes. 11/1/19  MET                      Patient will report no sharp pain with left shoulder abduction to aid in completion of ADLs.                  Status at IE:  Sharp pain left shoulder with abduction beyond 70 degrees.                  Current: No sharp pain with left shoulder abduction through full range. 10/22/19  MET                      Patient will increase left UE strength to 5/5 throughout all planes to aid in completion of ADLs.                  Status at IE: left shoulder flexion, abduction, external rotation 4/5                   Current: Same as IE                      Patient will increase FOTO score to 70 points overall to demonstrate improvement in functional status.                  Status at IE: FOTO 60/100                   Current: FOTO 64    10/25/19    PLAN  []  Upgrade activities as tolerated     [x]  Continue plan of care  []  Update interventions per flow sheet       []  Discharge due to:_  []  Other:_      Jose Rodney, PT, DPT 11/1/2019  3:45 PM    Future Appointments   Date Time Provider Kolby Ruiz   11/12/2019  9:00 AM GILSON Storm [facility]   11/15/2019  3:30 PM GILSON Storm [facility]

## 2019-11-11 ENCOUNTER — HOSPITAL ENCOUNTER (OUTPATIENT)
Dept: PHYSICAL THERAPY | Age: 49
Discharge: HOME | End: 2019-11-11
Payer: COMMERCIAL

## 2019-11-11 PROCEDURE — 97110 THERAPEUTIC EXERCISES: CPT | Performed by: PHYSICAL THERAPIST

## 2019-11-11 NOTE — PROGRESS NOTES
PT DAILY TREATMENT NOTE    Patient Name: Jeremy Ma  Date:2019  : 1970  [x]  Patient  Verified  Payor: BLUE SOWMYA / Plan: Jerome Garcia [id number] PPO / Product Type: PPO /    In time:2:35  Out time:3:13  Total Treatment Time (min): 38  Total Timed Codes (min): 38  1:1 Treatment Time ( only): 25   Visit #: 9 of 16    Treatment Area: Neck pain [M54.2]  Left shoulder pain [M25.512]    SUBJECTIVE  Pain Level (0-10 scale): 0  Any medication changes, allergies to medications, adverse drug reactions, diagnosis change, or new procedure performed?: [x] No    [] Yes (see summary sheet for update)  Subjective functional status/changes:   [] No changes reported  Feels better. Feels ready to be discharged at next viist    OBJECTIVE    25 min Therapeutic Exercise:  [x] See flow sheet :   Rationale: increase ROM, increase strength and decrease pain to improve the patients ability to complete ADLs       With   [] TE   [] TA   [] neuro   [] other: Patient Education: [x] Review HEP    [] Progressed/Changed HEP based on:   [] positioning   [] body mechanics   [] transfers   [] heat/ice application    [] other:      Other Objective/Functional Measures: NA     Pain Level (0-10 scale) post treatment: 0    ASSESSMENT/Changes in Function: Patient responds well to treatment session. Patient is progressing well. Minimal difficulty with exercises as prescribed.  Likely can be discharged at next visit per patient request.. No adverse effects were noted from today's treatment session       Patient will continue to benefit from skilled PT services to modify and progress therapeutic interventions, address functional mobility deficits, address ROM deficits, address strength deficits, analyze and address soft tissue restrictions, analyze and cue movement patterns, analyze and modify body mechanics/ergonomics, assess and modify postural abnormalities    []  See Plan of Care  []  See progress note/recertification  []  See Discharge Summary         Progress towards goals / Updated goals:  Short Term Goals: To be accomplished in 4 weeks:                   NNRQFFL will report compliance with HEP at least 1x/day to aid in rehabilitation program.                   Status at IE: Instructed in and provided written copy of initial HEP. Provided green theraband for home use.                   Current: Pt reports compliance of therex 3-4 times a week at this time 10/11/19                      Patient will display full left shoulder pain free AROM into flexion and abduction to aid in completion of ADLs.                  Status at IE: left shoulder flexion 127, abduction 67                   Current: AROM left shoulder flexion 152, abduction 161 pain free.  11/1/19  MET        Long Term Goals: To be accomplished in 8 weeks:                   Patient will report compliance with HEP a least 3-4x/week to aid in rehabilitation/strengthening program.                   Status at IE:  Instructed in and provided written copy of initial HEP. Provided green theraband for home use.                   Current: Patient consistent with HEP and also regularly participating in exercise classes. 11/1/19  MET                      Patient will report no sharp pain with left shoulder abduction to aid in completion of ADLs.                  Status at IE:  Sharp pain left shoulder with abduction beyond 70 degrees.                  Current: No sharp pain with left shoulder abduction through full range. 10/22/19  MET                      Patient will increase left UE strength to 5/5 throughout all planes to aid in completion of ADLs.                  Status at IE: left shoulder flexion, abduction, external rotation 4/5                   Current: Same as IE                      Patient will increase FOTO score to 70 points overall to demonstrate improvement in functional status.                     Status at IE: FOTO 60/100                   Current: EZFS 29 27/88/82       PLAN  []  Upgrade activities as tolerated     [x]  Continue plan of care  []  Update interventions per flow sheet       []  Discharge due to:_  []  Other:_      Isis Carrasquillo, PT, DPT 11/11/2019  2:39 PM    Future Appointments   Date Time Provider Kolby Ruiz   11/15/2019  3:30 PM Nicanor Watson [facility]

## 2019-11-12 ENCOUNTER — APPOINTMENT (OUTPATIENT)
Dept: PHYSICAL THERAPY | Age: 49
End: 2019-11-12
Payer: COMMERCIAL

## 2019-11-15 ENCOUNTER — HOSPITAL ENCOUNTER (OUTPATIENT)
Dept: PHYSICAL THERAPY | Age: 49
Discharge: HOME | End: 2019-11-15
Payer: COMMERCIAL

## 2019-11-15 PROCEDURE — 97110 THERAPEUTIC EXERCISES: CPT

## 2019-11-15 NOTE — PROGRESS NOTES
PT DAILY TREATMENT NOTE    Patient Name: Anita Justice  Date:11/15/2019  : 1970  [x]  Patient  Verified  Payor: BLUE CROSS / Plan: Jerome Garcia [id number] PPO / Product Type: PPO /    In time: 310  Out time: 350  Total Treatment Time (min): 40  Total Timed Codes (min): 40  1:1 Treatment Time (MC only): 40   Visit #: 10 of 16    Treatment Area: Neck pain [M54.2]  Left shoulder pain [M25.512]    SUBJECTIVE  Pain Level (0-10 scale): 0  Any medication changes, allergies to medications, adverse drug reactions, diagnosis change, or new procedure performed?: [x] No    [] Yes (see summary sheet for update)  Subjective functional status/changes:   [] No changes reported  \"I am back to the exercise classes and my strength has improved enough I can do the overhead weights again without any pain. \"    OBJECTIVE    40 min Therapeutic Exercise:  [x] See flow sheet :   Rationale: increase ROM, increase strength and decrease pain to improve the patients ability to complete ADLs    With   [] TE   [] TA   [] neuro   [] other: Patient Education: [x] Review HEP    [] Progressed/Changed HEP based on:   [] positioning   [] body mechanics   [] transfers   [] heat/ice application    [] other:      Other Objective/Functional Measures: NA     Pain Level (0-10 scale) post treatment: 0    ASSESSMENT/Changes in Function: Patient has been very well motivated and consistent with PT. She has steadily improved in AROM and functional strength to the point she is now pain free. She has met all PT goals and is ready for discharge to independent [facility]. []  See Plan of Care  []  See progress note/recertification  [x]  See Discharge Summary         Progress towards goals / Updated goals:  Short Term Goals: To be accomplished in 4 weeks:                   Patient will report compliance with HEP at least 1x/day to aid in rehabilitation program.                   Status at IE: Instructed in and provided written copy of initial HEP.  Provided green theraband for home use.                   Current: Pt reports compliance of therex 3-4 times a week at this time 10/11/19 MET                      Patient will display full left shoulder pain free AROM into flexion and abduction to aid in completion of ADLs.                  Status at IE: left shoulder flexion 127, abduction 67                   Current: AROM left shoulder flexion 152, abduction 161 pain free.  11/1/19  MET        Long Term Goals: To be accomplished in 8 weeks:                   Patient will report compliance with HEP a least 3-4x/week to aid in rehabilitation/strengthening program.                   Status at IE:  Instructed in and provided written copy of initial HEP. Provided green theraband for home use.                   Current: Patient consistent with HEP and also regularly participating in exercise classes.  11/1/19  MET                      Patient will report no sharp pain with left shoulder abduction to aid in completion of ADLs.                  Status at IE:  Sharp pain left shoulder with abduction beyond 70 degrees.                  Current: No sharp pain with left shoulder abduction through full range. 10/22/19  MET                      Patient will increase left UE strength to 5/5 throughout all planes to aid in completion of ADLs.                  Status at IE: left shoulder flexion, abduction, external rotation 4/5                   Current: left should strength improved to 5/5  11/15/19  MET                      Patient will increase FOTO score to 70 points overall to demonstrate improvement in functional status.                  Status at IE: FOTO 60/100                   Current: IGNU 73   86/96/52  MET    PLAN  []  Upgrade activities as tolerated     [x]  Continue plan of care  []  Update interventions per flow sheet       [x]  Discharge due to:  Goals Met  []  Other:_      Gayatri Haley, PT, DPT 11/15/2019  3:58 PM    No future appointments.

## 2019-11-15 NOTE — PROGRESS NOTES
[facility] at [address] [place] [address] Drive: [phone]   Fax: [phone]  Discharge Summary  Patient Name: Michell Carrillo : 1970   Medical   Diagnosis: Neck pain [M54.2]  Left shoulder pain [M25.512] Treatment Diagnosis: Left shoulder impingement syndrome   Onset Date: 4 months ago     Referral Source: Jessica Waterman MD Start of Care [facility]): 10/4/19   Prior Hospitalization: See medical history Provider #: [id number]   Prior Level of Function: active exerciser participating in multiple gym classes   Comorbidities: fibromyalgia   Medications: Verified on Patient Summary List      ===========================================================================================  Assessment / Summary of Care:  Michell Carrillo is a 44 y.o.  female has been very well motivated and consistent with PT. She has steadily improved in AROM and functional strength to the point she is now pain free. She has met all PT goals and is ready for discharge to independent [facility].    ===========================================================================================    Plan: Discharge to independent HEP. Goals:     Short Term Goals: To be accomplished in 4 weeks:                   [id number] will report compliance with HEP at least 1x/day to aid in rehabilitation program.                   Status at IE: Instructed in and provided written copy of initial HEP. Provided green theraband for home use.                   Current: Pt reports compliance of therex 3-4 times a week at this time 10/11/19 MET                      Patient will display full left shoulder pain free AROM into flexion and abduction to aid in completion of ADLs.                    Status at IE: left shoulder flexion 127, abduction 67                   Current: AROM left shoulder flexion 152, abduction 161 pain free.  19  MET        Long Term Goals: To be accomplished in 8 weeks:                   Patient will report compliance with HEP a least 3-4x/week to aid in rehabilitation/strengthening program.                   Status at IE:  Instructed in and provided written copy of initial HEP. Provided green theraband for home use.                   Current: Patient consistent with HEP and also regularly participating in exercise classes.  11/1/19  MET                      Patient will report no sharp pain with left shoulder abduction to aid in completion of ADLs.                  Status at IE:  Sharp pain left shoulder with abduction beyond 70 degrees.                  Current: No sharp pain with left shoulder abduction through full range. 10/22/19  MET                      Patient will increase left UE strength to 5/5 throughout all planes to aid in completion of ADLs.                  Status at IE: left shoulder flexion, abduction, external rotation 4/5                   Current: left should strength improved to 5/5  11/15/19  MET                      Patient will increase FOTO score to 70 points overall to demonstrate improvement in functional status.                     Status at IE: FOTO 60/100                   Current: CJNJ 24   58/86/73  MET    ===========================================================================================    Therapist Signature: Mecca Luna PT, LAURA Date: 11/15/2019     Time: 4:04 PM

## 2020-11-02 ENCOUNTER — HOSPITAL ENCOUNTER (OUTPATIENT)
Dept: PHYSICAL THERAPY | Age: 50
Discharge: HOME | End: 2020-11-02
Payer: COMMERCIAL

## 2020-11-02 PROCEDURE — 97110 THERAPEUTIC EXERCISES: CPT | Performed by: PHYSICAL THERAPIST

## 2020-11-02 PROCEDURE — 97161 PT EVAL LOW COMPLEX 20 MIN: CPT | Performed by: PHYSICAL THERAPIST

## 2020-11-02 NOTE — PROGRESS NOTES
[facility] at [address] [place] [address] Drive: [phone]   Fax: [phone]  PLAN OF CARE / [address] [place] [address] FOR PHYSICAL THERAPY SERVICES  Patient Name: Leonila Mcdaniel : 1970   Medical   Diagnosis: Left shoulder pain [M25.512] Treatment Diagnosis: Left shoulder pain   Onset Date: 10/26/2020     Referral Source: Gianna Slaughter MD Start of Care [facility]): 2020   Prior Hospitalization: See medical history Provider #: [id number]   Prior Level of Function: Active, weight lifting, independent w/ ADLs   Comorbidities: OA   Medications: Verified on Patient Summary List   The Plan of Care and following information is based on the information from the initial evaluation.   ===========================================================================================  Assessment / dorsey information:  Leonila Mcdaniel is a 44 y.o.  yo female presents s/p left shoulder arthroscopy on 10/26/2020. Has empty end feel in all planes and no pain with PROM. AROM limited secondary to pain.      Patient will continue to benefit from skilled PT services to modify and progress therapeutic interventions, address functional mobility deficits, address ROM deficits, address strength deficits, analyze and address soft tissue restrictions, analyze and cue movement patterns, analyze and modify body mechanics/ergonomics and assess and modify postural abnormalities to attain remaining goals. .    Pt instructed in HEP and will f/u in clinic for PT.  ===========================================================================================  Eval Complexity: History MEDIUM  Complexity : 1-2 comorbidities / personal factors will impact the outcome/ POC ;  Examination  MEDIUM Complexity : 3 Standardized tests and measures addressing body structure, function, activity limitation and / or participation in recreation ; Presentation LOW Complexity : Stable, uncomplicated ;  Decision Making MEDIUM Complexity : FOTO score of 26-74; Overall Complexity LOW   Problem List: pain affecting function, decrease ROM, decrease strength, edema affecting function, decrease ADL/ functional abilitiies, decrease activity tolerance, decrease flexibility/ joint mobility and decrease transfer abilities   Treatment Plan may include any combination of the following: Therapeutic exercise, Therapeutic activities, Neuromuscular re-education, Physical agent/modality, Gait/balance training, Manual therapy, Aquatic therapy, Patient education, Self Care training and Functional mobility training  Patient / Family readiness to learn indicated by: asking questions, trying to perform skills and interest  Persons(s) to be included in education: patient (P)  Barriers to Learning/Limitations: no  Measures Taken: NA  Patient Goal (s): Regain range of motion   Patient self reported health status: good  Rehabilitation Potential: good  Goals:  Short Term Goals: To be accomplished in 2 weeks:   Patient will report compliance with HEP at least 1x/day to aid in rehabilitation program.   Status at IE: NA   Current: Same as IE     Patient will display full pain free AAROM into flexin to aid in completion of ADLs. Status at IE: AROM 0-45 flex   Current: Same as IE      Long Term Goals: To be accomplished in 6 weeks:   Patient will report compliance with HEP a least 3-4x/week to aid in rehabilitation/strengthening program.   Status at IE: NA   Current: Same as IE     Patient will report no pain greater than 1-2/10 with overhead activities to aid in completion of ADLs. Status at IE: 3-5   Current: Same as IE     Patient will increase B UE strength to 5/5 throughout all planes to aid in completion of ADLs. Status at IE: 4-/5    Current: Same as IE     Patient will increase FOTO score to 67 points overall to demonstrate improvement in functional status.     Status at IE: 28   Current: Same as IE   *FOTO score is an established functional score where 100 = no disability*    Frequency / Duration:   Patient to be seen 2  times per week for 8  weeks:  Patient / Caregiver education and instruction: self care and exercises      . Therapist Signature: Fili Del Rio DPT, OCS Date: 15/6/5303   Certification Period: NA Time: 2:18 PM   ===========================================================================================  I certify that the above Physical Therapy Services are being furnished while the patient is under my care. I agree with the treatment plan and certify that this therapy is necessary. Physician Signature:        Date:       Time:     Please sign and return to [facility] or you may fax the signed copy to [phone]. Thank you.

## 2020-11-02 NOTE — PROGRESS NOTES
PT DAILY TREATMENT NOTE/SHOULDER EVAL 10-18    Patient Name: Ivan Close  Date:2020  : 1970  [x]  Patient  Verified  Payor: Rosalia Ruiz / Plan: [place] [employer] PPO / Product Type: PPO /    In time:9:30  Out time:10:15  Total Treatment Time (min): 45  Visit #: 1 of 12    Medicare/[facility] Only   Total Timed Codes (min):  25 1:1 Treatment Time:  45       Treatment Area: Left shoulder pain [M25.512]    SUBJECTIVE  Pain Level (0-10 scale): 2  []constant []intermittent []improving []worsening []no change since onset    Any medication changes, allergies to medications, adverse drug reactions, diagnosis change, or new procedure performed?: [x] No    [] Yes (see summary sheet for update)  Subjective functional status/changes:     Patient has c/c of left shoulder pain since 5 months ago. Was lifting weights then felt a sharp pain and has just snowballed since had SAD on 10/26/2020. Pain is located the whole shoulder. ELEANOR: see before. Patient describes pain as sharp, intermittent, otherwise dull ache. No diurnal features. Denies numbness/tingling. Reports popping/clicking, twice or three times . Aggravating factors: raising arm, . Alleviating factors: rest, not using the arm. Denies red flags: SOB, chest pain, dizziness/lightheadedness, blurred/double vision, HA, chills/fevers, night sweats, change in bowel/bladder control, abdominal pain, difficulty swallowing, slurred speech, unexplained weight gain/loss, nausea, vomiting. PMHx: OA,. Surgical Hx: left shoulder arthroscopy. Social Hx: NA home, alcohol, tobacco, work status: \"retired\" . PLOF: active, weight lifting, exercise. CLOF: unable. Diagnostic Imaging: NA. Recent falls Hx:NA.       OBJECTIVE/EXAMINATION    20 min []Eval                  []Re-Eval       25 min Therapeutic Exercise:  [x] See flow sheet :   Rationale: increase ROM, increase strength and decrease pain to improve the patients ability to complete ADLs            With   [] TE   [] TA   [] neuro   [] other: Patient Education: [x] Review HEP    [] Progressed/Changed HEP based on:   [] positioning   [] body mechanics   [] transfers   [] heat/ice application    [] other:        Physical Therapy Evaluation - Shoulder    Posture: [] Poor    [] Fair    [] Good    Describe:    ROM:  [] Unable to assess at this time                                           AROM                                                              PROM   Left Right  Left Right   Flexion 45 180 Flexion 115    Extension   Extension     Scaption/ABD 45 180 Scaptin/ABD 90    ER @ 0 Degrees   ER @ 0 Degrees     Apley ER   ER @ 90 Degrees 45    Apley IR L5 L2 IR @ 45 Degrees 45 90     End Feel / Painful Arc:    UE Strength:   [] Unable to assess at this time                                                                            L (1-5) R (1-5) Pain   Flexion  4 [] Yes   [] No   Abduction  4 [] Yes   [] No   ER  4 [] Yes   [] No   IR  4 [] Yes   [] No   Extension  5 [] Yes   [] No   Elbow flexion  5 [] Yes   [] No   Elbow Ext  4+ [] Yes   [] No   Wrist Flexion  5 [] Yes   [] No   Wrist Extension  5 [] Yes   [] No   Thumb extension   [] Yes   [] No   Finger Abduction   [] Yes   [] No       Scapulohumoral Control / Rhythm:  Able to eccentrically lower with good control?  Left: [] Yes   [] No     Right: [] Yes   [] No    Accessory Motions:    Palpation  [] Min  [] Mod  [] Severe    Location:  [] Min  [] Mod  [] Severe    Location:    Optional Tests:    Sensation Left Right Reflexes Left Right   Biceps (C5)   Biceps (C5)     Downs Radial(C6-7)   Brachioradialis (C6)     Downs Ulnar(C8-T1)   Triceps (C7)       Adson's Test  [] Pos   [] Neg Yergason's Test [] Pos   [] Neg  Alyssa's Test  [] Pos   [] Neg Suffolk's Sign [] Pos   [] Neg  Neer's Test  [] Pos   [] Neg Clunk Test  [] Pos   [] Neg  Hawkin's Test  [] Pos   [] Neg AC Joint  [] Pos   [] Neg  Speed's Test  [] Pos   [] Neg SC Joint  [] Pos   [] Neg  Empty Can  [] Pos   [] Neg Pectoral Tightness [] Pos   [] Neg  Anterior Apprehension [] Pos   [] Neg   Posterior Apprehension [] Pos   [] Neg      Other Tests / Comments:        Pain Level (0-10 scale) post treatment: 2    ASSESSMENT/Changes in Function: Patient presents s/p left shoulder arthroscopy on 10/26/2020. Has empty end feel in all planes and no pain with PROM. AROM limited secondary to pain. Patient will continue to benefit from skilled PT services to modify and progress therapeutic interventions, address functional mobility deficits, address ROM deficits, address strength deficits, analyze and address soft tissue restrictions, analyze and cue movement patterns, analyze and modify body mechanics/ergonomics and assess and modify postural abnormalities to attain remaining goals.      [x]  See Plan of Care  []  See progress note/recertification  []  See Discharge Summary         Progress towards goals / Updated goals:  See POC    PLAN  []  Upgrade activities as tolerated     [x]  Continue plan of care  []  Update interventions per flow sheet       []  Discharge due to:_  []  Other:_      Ida Sr PT, DPT 11/2/2020  9:41 AM

## 2020-11-06 ENCOUNTER — HOSPITAL ENCOUNTER (OUTPATIENT)
Dept: PHYSICAL THERAPY | Age: 50
Discharge: HOME | End: 2020-11-06
Payer: COMMERCIAL

## 2020-11-06 PROCEDURE — 97110 THERAPEUTIC EXERCISES: CPT | Performed by: PHYSICAL THERAPIST

## 2020-11-06 NOTE — PROGRESS NOTES
PT DAILY TREATMENT NOTE    Patient Name: Ivan Arora  Date:2020  : 1970  [x]  Patient  Verified  Payor: Rosalia Ruiz / Plan: [place] [employer] PPO / Product Type: PPO /    In time:9:30  Out time:10:08  Total Treatment Time (min): 38  Total Timed Codes (min): 38  1:1 Treatment Time ( only): 38   Visit #: 2 of 12    Treatment Area: Left shoulder pain [M25.512]    SUBJECTIVE  Pain Level (0-10 scale): 1  Any medication changes, allergies to medications, adverse drug reactions, diagnosis change, or new procedure performed?: [x] No    [] Yes (see summary sheet for update)  Subjective functional status/changes:   [] No changes reported  Feels good. I've been guarding less since last visit. It has helped    OBJECTIVE    38 min Therapeutic Exercise:  [x] See flow sheet :   Rationale: increase ROM, increase strength and decrease pain to improve the patients ability to complete ADLs  With   [] TE   [] TA   [] neuro   [] other: Patient Education: [x] Review HEP    [] Progressed/Changed HEP based on:   [] positioning   [] body mechanics   [] transfers   [] heat/ice application    [] other:      Other Objective/Functional Measures: NA     Pain Level (0-10 scale) post treatment: 2    ASSESSMENT/Changes in Function: Patient responds well to treatment session. Patient is progressing well. Minimal difficulty with exercises as prescribed. . No adverse effects were noted from today's treatment session       Patient will continue to benefit from skilled PT services to modify and progress therapeutic interventions, address functional mobility deficits, address ROM deficits, address strength deficits, analyze and address soft tissue restrictions, analyze and cue movement patterns, analyze and modify body mechanics/ergonomics, assess and modify postural abnormalities    []  See Plan of Care  []  See progress note/recertification  []  See Discharge Summary         Progress towards goals / Updated goals:  Short Term Goals:  To be accomplished in 2 weeks:                   Patient will report compliance with HEP at least 1x/day to aid in rehabilitation program.                   Status at IE: NA                   Current: Reports compliance, metk 11/6/2020                      Patient will display full pain free AAROM into flexin to aid in completion of ADLs. Status at IE: AROM 0-45 flex                   Current: Same as IE        Long Term Goals: To be accomplished in 6 weeks:                   Patient will report compliance with HEP a least 3-4x/week to aid in rehabilitation/strengthening program.                   Status at IE: NA                   Current: Same as IE                      Patient will report no pain greater than 1-2/10 with overhead activities to aid in completion of ADLs. Status at IE: 3-5                   Current: Same as IE                      Patient will increase B UE strength to 5/5 throughout all planes to aid in completion of ADLs. Status at IE: 4-/5                    Current: Same as IE                      Patient will increase FOTO score to 67 points overall to demonstrate improvement in functional status.                     Status at IE: 28                   Current: Same as IE              *FOTO score is an established functional score where 100 = no disability*    PLAN  []  Upgrade activities as tolerated     [x]  Continue plan of care  []  Update interventions per flow sheet       []  Discharge due to:_  []  Other:_      Brandon Pinto PT, DPT 11/6/2020  9:35 AM    Future Appointments   Date Time Provider Kolby Ruiz   11/9/2020  1:00 PM Junior Radha PT, DPT [facility]   11/13/2020  8:45 AM Junior Radha PT, DPT [facility]   11/16/2020 11:00 AM Junior Radha PT, DPT [facility]   11/20/2020 10:15 AM Junior Radha PT, DPT [facility]   11/23/2020 11:00 AM Junior Radha PT, DPT [facility]   11/25/2020  9:30 AM Junior Radha PT, DPT [facility]   11/30/2020 10:15 AM Maximilian Saleh, PT, DPT [facility]

## 2020-11-09 ENCOUNTER — HOSPITAL ENCOUNTER (OUTPATIENT)
Dept: PHYSICAL THERAPY | Age: 50
Discharge: HOME | End: 2020-11-09
Payer: COMMERCIAL

## 2020-11-09 PROCEDURE — 97110 THERAPEUTIC EXERCISES: CPT | Performed by: PHYSICAL THERAPIST

## 2020-11-09 NOTE — PROGRESS NOTES
PT DAILY TREATMENT NOTE    Patient Name: Britney Sarah  Date:2020  : 1970  [x]  Patient  Verified  Payor: Herman Flores / Plan: [place] [employer] PPO / Product Type: PPO /    In time:1:05  Out time:1:40  Total Treatment Time (min): 35  Total Timed Codes (min): 35   Visit #: 3 of 12    Treatment Area: Left shoulder pain [M25.512]    SUBJECTIVE  Pain Level (0-10 scale): 1  Any medication changes, allergies to medications, adverse drug reactions, diagnosis change, or new procedure performed?: [x] No    [] Yes (see summary sheet for update)  Subjective functional status/changes:   [] No changes reported  Feels okay. No new issues. OBJECTIVE    35 min Therapeutic Exercise:  [x] See flow sheet :   Rationale: increase ROM, increase strength and decrease pain to improve the patients ability to complete ADLs       With   [] TE   [] TA   [] neuro   [] other: Patient Education: [x] Review HEP    [] Progressed/Changed HEP based on:   [] positioning   [] body mechanics   [] transfers   [] heat/ice application    [] other:      Other Objective/Functional Measures: NA     Pain Level (0-10 scale) post treatment: 1    ASSESSMENT/Changes in Function: Patient responds well to treatment session. Patient is progressing well. Reports she will return to surgeon tomorrow and be able to obtain confirmation of her operation.  No adverse effects were noted from today's treatment session    Patient will continue to benefit from skilled PT services to modify and progress therapeutic interventions, address functional mobility deficits, address ROM deficits, address strength deficits, analyze and address soft tissue restrictions, analyze and cue movement patterns, analyze and modify body mechanics/ergonomics, assess and modify postural abnormalities,     []  See Plan of Care  []  See progress note/recertification  []  See Discharge Summary         Progress towards goals / Updated goals:  Short Term Goals: To be accomplished in 2 weeks:                   YYQDAKY will report compliance with HEP at least 1x/day to aid in rehabilitation program.                   Status at IE: NA                   Current: Reports compliance, metk 11/6/2020                      Patient will display full pain free AAROM into flexin to aid in completion of ADLs.                  Status at IE: AROM 0-45 flex                   Current: Same as IE        Long Term Goals: To be accomplished in 6 weeks:                   Patient will report compliance with HEP a least 3-4x/week to aid in rehabilitation/strengthening program.                   Status at IE: NA                   Current: Same as IE                      Patient will report no pain greater than 1-2/10 with overhead activities to aid in completion of ADLs.                  Status at IE: 3-5                   Current: Same as IE                      Patient will increase B UE strength to 5/5 throughout all planes to aid in completion of ADLs.                  Status at IE: 4-/5                    Current: Same as IE                      Patient will increase FOTO score to 67 points overall to demonstrate improvement in functional status.                     Status at IE: 28                   Current: Same as IE              *FOTO score is an established functional score where 100 = no disability*    PLAN  []  Upgrade activities as tolerated     [x]  Continue plan of care  []  Update interventions per flow sheet       []  Discharge due to:_  []  Other:_      Vikas Patches, PT, DPT 11/9/2020  1:18 PM    Future Appointments   Date Time Provider Kolby Ruiz   11/13/2020  8:45 AM Mo Lopez, PT, DPT [facility]   11/16/2020 11:00 AM Mo Lopez PT, DPT [facility]   11/20/2020 10:15 AM Mo Lopez, PT, DPT [facility]   11/23/2020 11:00 AM Mo Lopez, PT, DPT [facility]   11/25/2020  9:30 AM Mo Lopez, PT, DPT [facility]   11/30/2020 10:15 AM Mo Lopez, PT, DPT [facility]

## 2020-11-13 ENCOUNTER — HOSPITAL ENCOUNTER (OUTPATIENT)
Dept: PHYSICAL THERAPY | Age: 50
Discharge: HOME | End: 2020-11-13
Payer: COMMERCIAL

## 2020-11-13 PROCEDURE — 97110 THERAPEUTIC EXERCISES: CPT | Performed by: PHYSICAL THERAPIST

## 2020-11-13 PROCEDURE — 97161 PT EVAL LOW COMPLEX 20 MIN: CPT | Performed by: PHYSICAL THERAPIST

## 2020-11-13 NOTE — PROGRESS NOTES
PT DAILY TREATMENT NOTE    Patient Name: Huma Neely  Date:2020  : 1970  [x]  Patient  Verified  Payor: Shaka Tsang / Plan: [place] [employer] / Product Type: PPO /    In time:8:45  Out time:9:30  Total Treatment Time (min): 45  Total Timed Codes (min): 45   Visit #: 4 of 12    Treatment Area: Left shoulder pain [M25.512]    SUBJECTIVE  Pain Level (0-10 scale): 0  Any medication changes, allergies to medications, adverse drug reactions, diagnosis change, or new procedure performed?: [x] No    [] Yes (see summary sheet for update)  Subjective functional status/changes:   [] No changes reported  Feels okay. No new issues. OBJECTIVE    45 min Therapeutic Exercise:  [x] See flow sheet :   Rationale: increase ROM, increase strength and decrease pain to improve the patients ability to complete ADLs       With   [] TE   [] TA   [] neuro   [] other: Patient Education: [x] Review HEP    [] Progressed/Changed HEP based on:   [] positioning   [] body mechanics   [] transfers   [] heat/ice application    [] other:      Other Objective/Functional Measures: NA     Pain Level (0-10 scale) post treatment: 0    ASSESSMENT/Changes in Function: Patient responds well to treatment session. Patient is progressing well. Received MD note specifying what surgery was performed. Will progress as tolerated. Minimal difficulty with exercises as prescribed.  No adverse effects were noted from today's treatment session       Patient will continue to benefit from skilled PT services to modify and progress therapeutic interventions, address functional mobility deficits, address ROM deficits, address strength deficits, analyze and address soft tissue restrictions, analyze and cue movement patterns, analyze and modify body mechanics/ergonomics, assess and modify postural abnormalities    []  See Plan of Care  []  See progress note/recertification  []  See Discharge Summary         Progress towards goals / Updated goals:  Short Term Goals: To be accomplished in 2 weeks:                   CHARLOTTEVUY will report compliance with HEP at least 1x/day to aid in rehabilitation program.                   Status at IE: NA                   Current: Reports compliance, metk 11/6/2020                      Patient will display full pain free AAROM into flexin to aid in completion of ADLs.                  Status at IE: AROM 0-45 flex                   Current: Same as IE        Long Term Goals: To be accomplished in 6 weeks:                   Patient will report compliance with HEP a least 3-4x/week to aid in rehabilitation/strengthening program.                   Status at IE: NA                   Current: Same as IE                      Patient will report no pain greater than 1-2/10 with overhead activities to aid in completion of ADLs.                  Status at IE: 3-5                   Current: Same as IE                      Patient will increase B UE strength to 5/5 throughout all planes to aid in completion of ADLs.                  Status at IE: 4-/5                    Current: Same as IE                      Patient will increase FOTO score to 67 points overall to demonstrate improvement in functional status.                     Status at IE: 28                   Current: Same as IE              *FOTO score is an established functional score where 100 = no disability*    PLAN  []  Upgrade activities as tolerated     [x]  Continue plan of care  []  Update interventions per flow sheet       []  Discharge due to:_  []  Other:_      Angelika Schwab PT, DPT 11/13/2020  9:38 AM    Future Appointments   Date Time Provider Kolby Ruiz   11/16/2020 11:00 AM Portia Taylor PT, DPT [facility]   11/17/2020  8:45 AM Portia Taylor PT, DPT [facility]   11/23/2020 11:00 AM Portia Taylor PT, DPT [facility]   11/25/2020  9:30 AM Portia Taylor PT, DPT [facility]   11/30/2020 10:15 AM Portia Taylor PT, DPT [facility]

## 2020-11-13 NOTE — PROGRESS NOTES
[facility] at [address] [place] [address] Drive: [phone]   Fax: [phone]  PLAN OF CARE / [address] [place] [address] FOR PHYSICAL THERAPY SERVICES  Patient Name: Gene Hampton : 1970   Medical   Diagnosis: Neck pain [M54.2] Treatment Diagnosis: Neck pain   Onset Date: 5 years ago     Referral Source: Everette Kaiser MD [facility] of [facility]): 2020   Prior Hospitalization: See medical history Provider #: [id number]   Prior Level of Function: Active, independent w/ ADLs   Comorbidities: OA   Medications: Verified on Patient Summary List   The Plan of Care and following information is based on the information from the initial evaluation.   ===========================================================================================  Assessment / dorsey information:  Gene Hampton is a 44 y.o.  yo female presents with  chronic, non-specific neck pain. No evidence of segmental restrictions.      Patient will continue to benefit from skilled PT services to modify and progress therapeutic interventions, address functional mobility deficits, address ROM deficits, address strength deficits, analyze and address soft tissue restrictions, analyze and cue movement patterns, analyze and modify body mechanics/ergonomics and assess and modify postural abnormalities to attain remaining goals. .    Pt instructed in HEP and will f/u in clinic for PT.  ===========================================================================================  Eval Complexity: History MEDIUM  Complexity : 1-2 comorbidities / personal factors will impact the outcome/ POC ;  Examination  MEDIUM Complexity : 3 Standardized tests and measures addressing body structure, function, activity limitation and / or participation in recreation ; Presentation LOW Complexity : Stable, uncomplicated ;  Decision Making MEDIUM Complexity : FOTO score of 26-74; Overall Complexity LOW   Problem List: pain affecting function, decrease ROM, decrease strength, decrease ADL/ functional abilitiies, decrease activity tolerance, decrease flexibility/ joint mobility and decrease transfer abilities   Treatment Plan may include any combination of the following: Therapeutic exercise, Therapeutic activities, Neuromuscular re-education, Physical agent/modality, Manual therapy, Patient education and Self Care training  Patient / Family readiness to learn indicated by: asking questions, trying to perform skills and interest  Persons(s) to be included in education: patient (P)  Barriers to Learning/Limitations: no  Measures Taken: NA  Patient Goal (s): \"sustained relief, no longer needing trigger injections\"   Patient self reported health status: fair  Rehabilitation Potential: fair  Goals:  Short Term Goals: To be accomplished in 2 weeks:   Patient will report compliance with HEP 1x/day to aid in rehabilitation program.   Status at IE:NA   Current:Same as IE      Patient will increase cervical ROM to 100% in all planes to aid in completion of ADLs. Status at IE:75% in extension and left rotation   Current: Same as IE    Long Term Goals: To be accomplished in 4 weeks:   Patient will increase cervical strength to 5/5 MMT throughout to aid in completion of ADLs. Status at IE:4/5 flexion and extension   Current: Same as IE     Patient will report pain no greater than 1-2/10 throughout entire day to aid in completion of ADLs. 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 Status at [facility] raising left arm overhead due to recent surgery   Current: Same as IE     Patient will improve FOTO score to 66 points to demonstrate improvement in functional status.    Status at IE:57   Current: Same as IE   *FOTO score is an established functional score where 100 = no disability*    Frequency / Duration:   Patient to be seen 2  times per week for 6 weeks:  Patient / Caregiver education and instruction: self care and exercises      . Therapist Signature: Michael Prince DPT, OCS Date: 53/26/4369   Certification Period: NA Time: 1:17 PM   ===========================================================================================  I certify that the above Physical Therapy Services are being furnished while the patient is under my care. I agree with the treatment plan and certify that this therapy is necessary. Physician Signature:        Date:       Time:     Please sign and return to [facility] or you may fax the signed copy to [phone]. Thank you.

## 2020-11-13 NOTE — PROGRESS NOTES
PT DAILY TREATMENT NOTE/CERVICAL [id number]    Patient Name: Leonila Mcdaniel  Date:2020  : 1970  [x]  Patient  Verified  Payor: Alvarez Greenwood / Plan: [place] [employer] P[place] / Product Type: PPO /    In time:8:00  Out time:8:45  Total Treatment Time (min): 45  Visit #: 1 of 12    Medicare/[facility] Only   Total Timed Codes (min):  25 1:1 Treatment Time:  45     Treatment Area: Neck pain [M54.2]    SUBJECTIVE  Pain Level (0-10 scale): 0  []constant []intermittent []improving []worsening []no change since onset    Any medication changes, allergies to medications, adverse drug reactions, diagnosis change, or new procedure performed?: [x] No    [] Yes (see summary sheet for update)  Subjective functional status/changes:     Patient has CC of neck pain since 5 years ago. Have been very stressed. [place] ELEANOR: uncertain Patient describes pain as sharp when moving, dull if not moving. No diurnal features. Denies numbness/tingling. Denies popping/clicking. Aggravating factors: stress, sleeping in large lateral flexion. Alleviating factors: massage, chiropractic, acupuncture. Denies red flags: SOB, chest pain, dizziness/lightheadedness, blurred/double vision, HA, chills/fevers, night sweats, change in bowel/bladder control, abdominal pain, difficulty swallowing, slurred speech, unexplained weight gain/loss, nausea, vomiting. PMHx: OA. Surgical Hx: left SAD 2 weeks ago. Social Hx:  home, alcohol, tobacco, work status. PLOF: active, exercise, caring for parents. [place] CLOF: same.   Diagnostic Imaging: NA      OBJECTIVE/EXAMINATION    15 min [x]Eval                  []Re-Eval       25 min Therapeutic Exercise:  [] See flow sheet :   Rationale: increase ROM, increase strength and decrease pain to improve the patients ability to complete ADLs    With   [] TE   [] TA   [] neuro   [] other: Patient Education: [x] Review HEP    [] Progressed/Changed HEP based on:   [] positioning   [] body mechanics   [] transfers   [] heat/ice application    [] other:          Physical Therapy Evaluation Cervical Spine     OBJECTIVE  Posture: [] WNL  Head Position:  Shoulder/Scapular Position:  C-Kyphosis:  [] increased   [] decreased   C-Lordosis:   [] increased   [] decreased  T-Kyphosis:  [] increased   [] decreased  T-Lordosis:   [] increased   [] decreased     Cervical Retraction: [] WNL    [] Abnormal:    Shoulder/Scapular Screen: [] WNL    [] Abnormal:    Active Movements: [] N/A   [] Too acute   [] Other:  ROM % AROM % PROM Comments:pain, area   Forward flexion 100     Extension 100     SB right 100     SB left  100     Rotation right 100     Rotation left 75       Thoracic Spine: [] N/A    [] WNL   [] Other:    Palpation:  [] Min  [] Mod  [] Severe    Location:  [] Min  [] Mod  [] Severe    Location:    UE Myotome:   [] Unable to assess at this time                                                                            L (1-5) R (1-5) Pain   C1 - Cerivcal flex 4 4 [] Yes   [] No   C2 - Cervical ext 4 4 [] Yes   [] No   C3 - Cervical SB 5 5 [] Yes   [] No   C4 - Shoulder shrug 5 5 [] Yes   [] No   C5 - Shoulder abd   [] Yes   [] No   C6 - Elbow Flex/wrist ext   [] Yes   [] No   C7 - Elbow Ext/wrist flex   [] Yes   [] No   C8 -Thumb ext (thumbs up)   [] Yes   [] No   T1 - Finger abduction   [] Yes   [] No       Upper Limb Tension Tests: [] N/A       Ulnar: [] R    [] L    [] +    [] -       Median: [] R    [] L    [] +    [] -       Radial: [] R    [] L    [] +    [] -    Special Tests:  Cervical:        Vertebral Artery:  [] R    [] L    [] +    [] -       Alar Ligament: [] R    [] L    [] +    [] -       Transverse Lig: [] R    [] L    [] +    [] -       Spurling's:  [] R    [] L    [] +    [] -       Distraction:  [] R    [] L    [] +    [] -       Compression: [] R    [] L    [] +    [] -    Thoracic Outlet Tests: [] N/A       Adson's:  [] R    [] L    [] +    [] -       Hyperabduction: [] R    [] L    [] +    [] -       Antelmo's:  [] R    [] L    [] +    [] Reece Osler:  [] R    [] L    [] +    [] -    Diaphragmatic Breathing: [] Normal    [] Abnormal      Other tests/comments: No segmental immobility. Pain Level (0-10 scale) post treatment: 0    ASSESSMENT/Changes in Function: Patient presents with chronic, non-specific neck pain. No evidence of segmental restrictions. Patient will continue to benefit from skilled PT services to modify and progress therapeutic interventions, address functional mobility deficits, address ROM deficits, address strength deficits, analyze and address soft tissue restrictions, analyze and cue movement patterns, analyze and modify body mechanics/ergonomics and assess and modify postural abnormalities to attain remaining goals.      [x]  See Plan of Care  []  See progress note/recertification  []  See Discharge Summary         Progress towards goals / Updated goals:  See POC    PLAN  []  Upgrade activities as tolerated     [x]  Continue plan of care  []  Update interventions per flow sheet       []  Discharge due to:_  []  Other:_      Odalys Rinaldi PT, DPT 11/13/2020  8:13 AM

## 2020-11-16 ENCOUNTER — APPOINTMENT (OUTPATIENT)
Dept: PHYSICAL THERAPY | Age: 50
End: 2020-11-16
Payer: COMMERCIAL

## 2020-11-16 ENCOUNTER — HOSPITAL ENCOUNTER (OUTPATIENT)
Dept: PHYSICAL THERAPY | Age: 50
Discharge: HOME | End: 2020-11-16
Payer: COMMERCIAL

## 2020-11-16 PROCEDURE — 97110 THERAPEUTIC EXERCISES: CPT

## 2020-11-16 PROCEDURE — 97110 THERAPEUTIC EXERCISES: CPT | Performed by: PHYSICAL THERAPIST

## 2020-11-16 NOTE — PROGRESS NOTES
PT DAILY TREATMENT NOTE  Patient Name: Helga Dye  Date:2020  : 1970  [x]  Patient  Verified  Payor: Racheal Grimaldo / Plan: [place] [employer] [place] / Product Type: PPO /    In time:1345  Out time:1430  Total Treatment Time (min): 45  Total Timed Codes (min): 45   Visit #: 2 of 12    Treatment Area: Neck pain [M54.2]    SUBJECTIVE  Pain Level (0-10 scale): 0  Any medication changes, allergies to medications, adverse drug reactions, diagnosis change, or new procedure performed?: [x] No    [] Yes (see summary sheet for update)  Subjective functional status/changes:   [] No changes reported    Patient reports trigger point injection has helped to control her pain. OBJECTIVE    45 min Therapeutic Exercise:  [x] See flow sheet :   Rationale: increase ROM, increase strength and decrease pain to improve the patients ability to complete ADLs          With   [x] TE   [] TA   [] neuro   [] other: Patient Education: [x] Review HEP    [] Progressed/Changed HEP based on:   [] positioning   [] body mechanics   [] transfers   [] heat/ice application    [] other:      Other Objective/Functional Measures: NA     Pain Level (0-10 scale) post treatment: 0    ASSESSMENT/Changes in Function: Patient responds well to treatment session. Provided cues for activity pacing. Introduced cervical stabilization activities. She was challenged with activities prescribed. She had c/o discomfort with cervical extension in seated position. Introduced isometric exercise and she had a reduction in symptoms with cervical extension. Added activity to HEP. No adverse effects were noted from today's treatment session.     Patient will continue to benefit from skilled PT services to modify and progress therapeutic interventions, address functional mobility deficits, address ROM deficits, address strength deficits, analyze and address soft tissue restrictions, analyze and cue movement patterns, analyze and modify body mechanics/ergonomics, assess and modify postural abnormalities, and instruct in home and community integration to attain remaining goals. []  See Plan of Care  []  See progress note/recertification  []  See Discharge Summary         Progress towards goals / Updated goals:  Short Term Goals: To be accomplished in 2 weeks:                   Patient will report compliance with HEP 1x/day to aid in rehabilitation program.                   Status at IE:NA                   Current: In-progress, introduced cervical isometric to manage symptoms 11/16/2020                     Patient will increase cervical ROM to 100% in all planes to aid in completion of ADLs. Status at IE:75% in extension and left rotation                   Current: Same as IE     Long Term Goals: To be accomplished in 4 weeks:                   Patient will increase cervical strength to 5/5 MMT throughout to aid in completion of ADLs. Status at IE:4/5 flexion and extension                   Current: Same as IE                      Patient will report pain no greater than 1-2/10 throughout entire day to aid in completion of ADLs. 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 Status at [address] [facility] raising left arm overhead due to recent surgery                   Current: Same as IE                      Patient will improve FOTO score to 66 points to demonstrate improvement in functional status.                    Status at IE:57                   Current: Same as IE              *FOTO score is an established functional score where 100 = no disability*  PLAN  []  Upgrade activities as tolerated     [x]  Continue plan of care  []  Update interventions per flow sheet       []  Discharge due to:_  []  Other:_      Janes Tong PT, DPT 11/16/2020  1:56 PM    Future Appointments   Date Time Provider Kolby Ruiz   11/18/2020  8:00 AM Leotha Dinning, PT, DPT [facility]   11/18/2020  8:45 AM Leotha Dinning, PT, DPT [facility]   11/23/2020 11:00 AM Leotha Dinning, PT, DPT [facility]   11/23/2020  1:00 PM Leotha Dinning, PT, DPT [facility]   11/25/2020  8:00 AM Leotha Dinning, PT, DPT [facility]   11/25/2020  9:30 AM Leotha Dinning, PT, DPT [facility]   11/30/2020 10:15 AM Leotha Dinning, PT, DPT [facility]   11/30/2020 11:00 AM Leotha Dinning, PT, DPT [facility]   12/2/2020  8:00 AM Leotha Dinning, PT, DPT [facility]   12/2/2020  8:45 AM Leotha Dinning, PT, DPT [facility]   12/8/2020 10:15 AM Leotha Dinning, PT, DPT [facility]   12/8/2020 11:00 AM Leotha Dinning, PT, DPT [facility]   12/9/2020  8:00 AM Leotha Dinning, PT, DPT [facility]   12/9/2020  8:45 AM Leotha Dinning, PT, DPT [facility]   12/14/2020 10:15 AM Leotha Dinning, PT, DPT [facility]   12/14/2020 11:00 AM Leotha Dinning, PT, DPT [facility]   12/16/2020  8:00 AM Martha Schreiber PT, LAURA [facility]   12/16/2020  8:45 AM Martha Schreiber PT, LAURA [facility]

## 2020-11-16 NOTE — PROGRESS NOTES
PT DAILY TREATMENT NOTE    Patient Name: Albin Christina  Date:2020  : 1970  [x]  Patient  Verified  Payor: Alfonso Larry / [place]: [place] [employer] / Product Type: PPO /    In time:11:05  Out time:11:44  Total Treatment Time (min): 39  Total Timed Codes (min): 39   Visit #: 5 of 12    Treatment Area: Left shoulder pain [M25.512]    SUBJECTIVE  Pain Level (0-10 scale): 1  Any medication changes, allergies to medications, adverse drug reactions, diagnosis change, or new procedure performed?: [x] No    [] Yes (see summary sheet for update)  Subjective functional status/changes:   [] No changes reported  Have been using the arm more so to get things done. I mowed the lawn over the weekend. It's been a little sore. OBJECTIVE    39 min Therapeutic Exercise:  [x] See flow sheet :   Rationale: increase ROM, increase strength and decrease pain to improve the patients ability to complete ADLs    With   [] TE   [] TA   [] neuro   [] other: Patient Education: [x] Review HEP    [] Progressed/Changed HEP based on:   [] positioning   [] body mechanics   [] transfers   [] heat/ice application    [] other:      Other Objective/Functional Measures: 1     Pain Level (0-10 scale) post treatment: 1    ASSESSMENT/Changes in Function: Patient responds well to treatment session. Patient is progressing well. Is challenged with OH shoulder press. Unable to move through full ROM. Instructed to perform this only 3x/week currently. She was receptive.  No adverse effects were noted from today's treatment session    Patient will continue to benefit from skilled PT services to modify and progress therapeutic interventions, address functional mobility deficits, address ROM deficits, address strength deficits, analyze and address soft tissue restrictions, analyze and cue movement patterns, analyze and modify body mechanics/ergonomics, assess and modify postural abnormalities    []  See Plan of Care  []  See progress note/recertification  [] See Discharge Summary         Progress towards goals / Updated goals:  Short Term Goals: To be accomplished in 2 weeks:                   PICYTNP will report compliance with HEP at least 1x/day to aid in rehabilitation program.                   Status at IE: NA                   Current: Reports compliance, metk 11/6/2020                      Patient will display full pain free AAROM into flexin to aid in completion of ADLs.                  Status at IE: AROM 0-45 flex                   Current: 0-140 flexion, Met 11/16/2020        Long Term Goals: To be accomplished in 6 weeks:                   Patient will report compliance with HEP a least 3-4x/week to aid in rehabilitation/strengthening program.                   Status at IE: NA                   Current: Same as IE                      Patient will report no pain greater than 1-2/10 with overhead activities to aid in completion of ADLs.                  Status at IE: 3-5                   Current: Same as IE                      Patient will increase B UE strength to 5/5 throughout all planes to aid in completion of ADLs.                  Status at IE: 4-/5                    Current: Same as IE                      Patient will increase FOTO score to 67 points overall to demonstrate improvement in functional status.                     Status at IE: 28                   Current: Same as IE              *FOTO score is an established functional score where 100 = no disability*       PLAN  []  Upgrade activities as tolerated     [x]  Continue plan of care  []  Update interventions per flow sheet       []  Discharge due to:_  []  Other:_      Kevin Barnhart PT, DPT 11/16/2020  11:28 AM    Future Appointments   Date Time Provider Kolby Ruiz   11/18/2020  8:00 AM Zack Bullock PT, DPT [facility]   11/18/2020  8:45 AM Zack Bullock PT, DPT [facility]   11/23/2020 11:00 AM Zack Bullock PT, DPT [facility]   11/23/2020  1:00 PM Zack Bullock PT, DPT [facility]   11/25/2020  8:00 AM Randolph American, PT, DPT [facility]   11/25/2020  9:30 AM Shaylee American, PT, DPT [facility]   11/30/2020 10:15 AM Randolph American, PT, DPT [facility]   11/30/2020 11:00 AM Shaylee American, PT, DPT [facility]   12/2/2020  8:00 AM Randolph American, PT, DPT [facility]   12/2/2020  8:45 AM Shaylee American, PT, DPT [facility]   12/8/2020 10:15 AM Randolph American, PT, DPT [facility]   12/8/2020 11:00 AM Shaylee American, PT, DPT [facility]   12/9/2020  8:00 AM Randolph American, PT, DPT [facility]   12/9/2020  8:45 AM Randolph American, PT, DPT [facility]   12/14/2020 10:15 AM Randolph American, PT, DPT [facility]   12/14/2020 11:00 AM Shaylee American, PT, DPT [facility]   12/16/2020  8:00 AM Randolph American, PT, DPT [facility]   12/16/2020  8:45 AM Randolph American, PT, DPT [facility]

## 2020-11-17 ENCOUNTER — APPOINTMENT (OUTPATIENT)
Dept: PHYSICAL THERAPY | Age: 50
End: 2020-11-17
Payer: COMMERCIAL

## 2020-11-18 ENCOUNTER — HOSPITAL ENCOUNTER (OUTPATIENT)
Dept: PHYSICAL THERAPY | Age: 50
Discharge: HOME | End: 2020-11-18
Payer: COMMERCIAL

## 2020-11-18 PROCEDURE — 97110 THERAPEUTIC EXERCISES: CPT | Performed by: PHYSICAL THERAPIST

## 2020-11-18 NOTE — PROGRESS NOTES
PT DAILY TREATMENT NOTE    Patient Name: Ulisses Driver  Date:2020  : 1970  [x]  Patient  Verified  Payor: [employer] / [place]: [place] [employer] / Product Type: PPO /    In time:8:44  Out time:9:20  Total Treatment Time (min): 36  Total Timed Codes (min): 36  Visit #: 3 of 12    Treatment Area: Neck pain [M54.2]    SUBJECTIVE  Pain Level (0-10 scale): 0  Any medication changes, allergies to medications, adverse drug reactions, diagnosis change, or new procedure performed?: [x] No    [] Yes (see summary sheet for update)  Subjective functional status/changes:   [] No changes reported  Feels good. \"Neck is just fine. \"    OBJECTIVE    36 min Therapeutic Exercise:  [x] See flow sheet :   Rationale: increase ROM, increase strength and decrease pain to improve the patients ability to complete ADLs       With   [] TE   [] TA   [] neuro   [] other: Patient Education: [x] Review HEP    [] Progressed/Changed HEP based on:   [] positioning   [] body mechanics   [] transfers   [] heat/ice application    [] other:      Other Objective/Functional Measures: NA     Pain Level (0-10 scale) post treatment: 0    ASSESSMENT/Changes in Function: Patient responds well to treatment session. Patient has minimal difficulty with exercises as prescribed. Will progress as tolerated.  No adverse effects were noted from today's treatment session       Patient will continue to benefit from skilled PT services to modify and progress therapeutic interventions, address functional mobility deficits, address ROM deficits, address strength deficits, analyze and address soft tissue restrictions, analyze and cue movement patterns, analyze and modify body mechanics/ergonomics, assess and modify postural abnormalities  []  See Plan of Care  []  See progress note/recertification  []  See Discharge Summary         Progress towards goals / Updated goals:  Short Term Goals: To be accomplished in 2 weeks:                   Patient will report compliance with HEP 1x/day to aid in rehabilitation program.                   Status at IE:NA                   Current: In-progress, introduced cervical isometric to manage symptoms 11/16/2020                      Patient will increase cervical ROM to 100% in all planes to aid in completion of ADLs.                  Status at IE:75% in extension and left rotation                   Current: Same as IE     Long Term Goals: To be accomplished in 4 weeks:                   Patient will increase cervical strength to 5/5 MMT throughout to aid in completion of ADLs.                  Status at IE:4/5 flexion and extension                   Current: Same as IE                      Patient will report pain no greater than 1-2/10 throughout entire day to aid in completion of ADLs.                  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                  Status at [facility] raising left arm overhead due to recent surgery                   Current: Same as IE                      Patient will improve FOTO score to 66 points to demonstrate improvement in functional status.                    Status at IE:57                   Current: Same as IE              *FOTO score is an established functional score where 100 = no disability*    PLAN  []  Upgrade activities as tolerated     [x]  Continue plan of care  []  Update interventions per flow sheet       []  Discharge due to:_  []  Other:_      Lauren Mccullough PT, DPT 11/18/2020  8:45 AM    Future Appointments   Date Time Provider Kolby Ruiz   11/23/2020 11:00 AM Lara Catalan PT, DPT [facility]   11/23/2020  1:00 PM Lara Catalan PT, DPT [facility]   11/25/2020  8:00 AM Lara Catalan PT, DPT [facility]   11/25/2020  9:30 AM Lara Catalan PT, DPT [facility]   11/30/2020 10:15 AM Lara Catalan PT, DPT [facility]   11/30/2020 11:00 AM Lara Catalan PT, DPT [facility]   12/2/2020  8:00 AM Reji Couch PT, DPT [facility]   12/2/2020  8:45 AM Reji Couch PT, DPT [facility]   12/8/2020 10:15 AM Reji Couch PT, DPT [facility]   12/8/2020 11:00 AM Reji Couch PT, DPT [facility]   12/9/2020  8:00 AM Reji Couch PT, DPT [facility]   12/9/2020  8:45 AM Reji Couch PT, DPT [facility]   12/14/2020 10:15 AM Reji Couch PT, DPT [facility]   12/14/2020 11:00 AM Reji Couch PT, DPT [facility]   12/16/2020  8:00 AM Reji Couch PT, DPT [facility]   12/16/2020  8:45 AM Reji Couch PT, DPT [facility]

## 2020-11-18 NOTE — PROGRESS NOTES
PT DAILY TREATMENT NOTE    Patient Name: Tricia Abebe  Date:2020  : 1970   [x]  Patient  Verified  Payor: Ronna Song / [place]: [place] [employer] [place] / Product Type: PPO /    In time:8:00  Out time:8:43  Total Treatment Time (min): 43  Total Timed Codes (min): 43   Visit #: 6 of 12    Treatment Area: Left shoulder pain [M25.512]    SUBJECTIVE  Pain Level (0-10 scale): 1  Any medication changes, allergies to medications, adverse drug reactions, diagnosis change, or new procedure performed?: [x] No    [] Yes (see summary sheet for update)  Subjective functional status/changes:   [] No changes reported  Feels a bit more pain today. OBJECTIVE    43 min Therapeutic Exercise:  [x] See flow sheet :   Rationale: increase ROM, increase strength and decrease pain to improve the patients ability to complete ADLs       With   [] TE   [] TA   [] neuro   [] other: Patient Education: [x] Review HEP    [] Progressed/Changed HEP based on:   [] positioning   [] body mechanics   [] transfers   [] heat/ice application    [] other:      Other Objective/Functional Measures: NA     Pain Level (0-10 scale) post treatment: 1    ASSESSMENT/Changes in Function: Patient responds well to treatment session. Patient is progressing well. Has some difficulty with exercises today due to increased soreness/pain, but is able to complete. ROM still lacking with OH shoulder press(without weight).  . No adverse effects were noted from today's treatment session       Patient will continue to benefit from skilled PT services to modify and progress therapeutic interventions, address functional mobility deficits, address ROM deficits, address strength deficits, analyze and address soft tissue restrictions, analyze and cue movement patterns, analyze and modify body mechanics/ergonomics, assess and modify postural abnormalities    []  See Plan of Care  []  See progress note/recertification  []  See Discharge Summary         Progress towards goals / Updated goals:  Short Term Goals: To be accomplished in 2 weeks:                   AKJVLFL will report compliance with HEP at least 1x/day to aid in rehabilitation program.                   Status at IE: NA                   Current: Reports compliance, metk 11/6/2020                      Patient will display full pain free AAROM into flexin to aid in completion of ADLs.                  Status at IE: AROM 0-45 flex                   Current: 0-140 flexion, Met 11/16/2020        Long Term Goals: To be accomplished in 6 weeks:                   Patient will report compliance with HEP a least 3-4x/week to aid in rehabilitation/strengthening program.                   Status at IE: NA                   Current: Same as IE                      Patient will report no pain greater than 1-2/10 with overhead activities to aid in completion of ADLs.                  Status at IE: 3-5                   Current: Same as IE                      Patient will increase B UE strength to 5/5 throughout all planes to aid in completion of ADLs.                  Status at IE: 4-/5                    Current: Same as IE                      Patient will increase FOTO score to 67 points overall to demonstrate improvement in functional status.                     Status at IE: 28                   Current: Same as IE              *FOTO score is an established functional score where 100 = no disability*    PLAN  []  Upgrade activities as tolerated     [x]  Continue plan of care  []  Update interventions per flow sheet       []  Discharge due to:_  []  Other:_      Florencia Velazquez PT, DPT 11/18/2020  8:26 AM    Future Appointments   Date Time Provider Kolby Ruiz   11/18/2020  8:45 AM Kenan France PT, DPT [facility]   11/23/2020 11:00 AM Kenan France PT, DPT [facility]   11/23/2020  1:00 PM Kenan France PT, DPT [facility]   11/25/2020  8:00 AM Kenan France PT, DPT [facility]   11/25/2020  9:30 AM Kenan France PT, DPT [facility]   11/30/2020 10:15 AM Elly Polo, PT, DPT [facility]   11/30/2020 11:00 AM Elly Polo, PT, DPT [facility]   12/2/2020  8:00 AM Elly Polo, PT, DPT [facility]   12/2/2020  8:45 AM Elly Polo, PT, DPT [facility]   12/8/2020 10:15 AM Elly Polo, PT, DPT [facility]   12/8/2020 11:00 AM Elly Polo, PT, DPT [facility]   12/9/2020  8:00 AM Elly Polo, PT, DPT [facility]   12/9/2020  8:45 AM Elly Polo, PT, DPT [facility]   12/14/2020 10:15 AM Elly Polo, PT, DPT [facility]   12/14/2020 11:00 AM Elly Polo, PT, DPT [facility]   12/16/2020  8:00 AM Elly Polo, PT, DPT [facility]   12/16/2020  8:45 AM Elly Polo, PT, DPT [facility]

## 2020-11-20 ENCOUNTER — APPOINTMENT (OUTPATIENT)
Dept: PHYSICAL THERAPY | Age: 50
End: 2020-11-20
Payer: COMMERCIAL

## 2020-11-23 ENCOUNTER — HOSPITAL ENCOUNTER (OUTPATIENT)
Dept: PHYSICAL THERAPY | Age: 50
Discharge: HOME | End: 2020-11-23
Payer: COMMERCIAL

## 2020-11-23 PROCEDURE — 97110 THERAPEUTIC EXERCISES: CPT | Performed by: PHYSICAL THERAPIST

## 2020-11-23 NOTE — PROGRESS NOTES
PT DAILY TREATMENT NOTE    Patient Name: Kamilah Molina  Date:2020  : 1970  [x]  Patient  Verified  Payor: Iva Evans / [place]: [place] [employer] [place] / Product Type: PPO /    In time:1:05  Out time:46  Total Treatment Time (min): 46  Total Timed Codes (min): 46  1:1 Treatment Time ( [address] only): 55   Visit #: 7 of 12    Treatment Area: Left shoulder pain [M25.512]    SUBJECTIVE  Pain Level (0-10 scale): 1  Any medication changes, allergies to medications, adverse drug reactions, diagnosis change, or new procedure performed?: [x] No    [] Yes (see summary sheet for update)  Subjective functional status/changes:   [] No changes reported  Feels okay. The shoulder is mostly just sore from the shingles injection. OBJECTIVE    46 min Therapeutic Exercise:  [x] See flow sheet :   Rationale: increase ROM, increase strength and decrease pain to improve the patients ability to complete ADLs    With   [] TE   [] TA   [] neuro   [] other: Patient Education: [x] Review HEP    [] Progressed/Changed HEP based on:   [] positioning   [] body mechanics   [] transfers   [] heat/ice application    [] other:      Other Objective/Functional Measures: NA     Pain Level (0-10 scale) post treatment: 1    ASSESSMENT/Changes in Function: Patient responds well to treatment session. Patient is progressing well Minimal difficulty with exercises as prescribed. Will progress as tolerated.  No adverse effects were noted from today's treatment session       Patient will continue to benefit from skilled PT services to modify and progress therapeutic interventions, address functional mobility deficits, address ROM deficits, address strength deficits, analyze and address soft tissue restrictions, analyze and cue movement patterns, analyze and modify body mechanics/ergonomics, assess and modify postural abnormalities,    []  See Plan of Care  []  See progress note/recertification  []  See Discharge Summary         Progress towards goals / Updated goals:  Short Term Goals: To be accomplished in 2 weeks:                   YNHBYBM will report compliance with HEP at least 1x/day to aid in rehabilitation program.                   Status at IE: NA                   Current: Reports compliance, metk 11/6/2020                      Patient will display full pain free AAROM into flexin to aid in completion of ADLs.                  Status at IE: AROM 0-45 flex                   Current: 0-140 flexion, Met 11/16/2020        Long Term Goals: To be accomplished in 6 weeks:                   Patient will report compliance with HEP a least 3-4x/week to aid in rehabilitation/strengthening program.                   Status at IE: NA                   Current: Same as IE                      Patient will report no pain greater than 1-2/10 with overhead activities to aid in completion of ADLs.                  Status at IE: 3-5                   Current: Same as IE                      Patient will increase B UE strength to 5/5 throughout all planes to aid in completion of ADLs.                  Status at IE: 4-/5                    Current: Same as IE                      Patient will increase FOTO score to 67 points overall to demonstrate improvement in functional status.                     Status at IE: 28                   Current: Same as IE              *FOTO score is an established functional score where 100 = no disability*    PLAN  []  Upgrade activities as tolerated     [x]  Continue plan of care  []  Update interventions per flow sheet       []  Discharge due to:_  []  Other:_      Matt Guillen PT, DPT 11/23/2020  12:54 PM    Future Appointments   Date Time Provider Kolby Ruiz   11/23/2020  1:00 PM Shaylee American, PT, DPT [facility]   11/25/2020  8:00 AM Shaylee American, PT, DPT [facility]   11/25/2020  8:45 AM Shaylee American, PT, DPT [facility]   11/30/2020 10:15 AM Shaylee American, PT, DPT [facility]   11/30/2020 11:00 AM Shaylee American, PT, DPT [facility]   12/2/2020  8:00 AM Devonte Heads, PT, DPT [facility]   12/2/2020  8:45 AM Devonte Heads, PT, DPT [facility]   12/8/2020 10:15 AM Devonte Heads, PT, DPT [facility]   12/8/2020 11:00 AM Devonte Heads, PT, DPT [facility]   12/9/2020  8:00 AM Devonte Heads, PT, DPT [facility]   12/9/2020  8:45 AM Devonte Heads, PT, DPT [facility]   12/14/2020 10:15 AM Devonte Heads, PT, DPT [facility]   12/14/2020 11:00 AM Devonte Heads, PT, DPT [facility]   12/16/2020  8:00 AM Devonte Heads, PT, DPT [facility]   12/16/2020  8:45 AM Devonte Heads, PT, DPT [facility]

## 2020-11-23 NOTE — PROGRESS NOTES
PT DAILY TREATMENT NOTE    Patient Name: Liana Juarez  Date:2020  : 1970  [x]  Patient  Verified  Payor: Gigi  / [place]: [place] [employer] / Product Type: PPO /    In time:11:00  Out time:11:42  Total Treatment Time (min): 42  Total Timed Codes (min): 42   Visit #: 4 of 12    Treatment Area: Neck pain [M54.2]    SUBJECTIVE  Pain Level (0-10 scale): 1  Any medication changes, allergies to medications, adverse drug reactions, diagnosis change, or new procedure performed?: [x] No    [] Yes (see summary sheet for update)  Subjective functional status/changes:   [] No changes reported  Feels okay. No new issues. OBJECTIVE    42 min Therapeutic Exercise:  [x] See flow sheet :   Rationale: increase ROM, increase strength and decrease pain to improve the patients ability to complete ADLs       With   [] TE   [] TA   [] neuro   [] other: Patient Education: [x] Review HEP    [] Progressed/Changed HEP based on:   [] positioning   [] body mechanics   [] transfers   [] heat/ice application    [] other:      Other Objective/Functional Measures: NA     Pain Level (0-10 scale) post treatment: 0    ASSESSMENT/Changes in Function: Patient responds well to treatment session. Patient is progressing well. Minimal difficulty with exercises. Will progress as tolerated.  No adverse effects were noted from today's treatment session       Patient will continue to benefit from skilled PT services to modify and progress therapeutic interventions, address functional mobility deficits, address ROM deficits, address strength deficits, analyze and address soft tissue restrictions, analyze and cue movement patterns, analyze and modify body mechanics/ergonomics, assess and modify postural abnormalities, address imbalance/dizziness and instruct in home and community integration to attain remaining goals      []  See Plan of Care  []  See progress note/recertification  []  See Discharge Summary         Progress towards goals / Updated goals:  Short Term Goals: To be accomplished in 2 weeks:                   OSBDPER will report compliance with HEP 1x/day to aid in rehabilitation program.                   Status at IE:NA                   Current:  In-progress, introduced cervical isometric to manage symptoms 11/16/2020                      Patient will increase cervical ROM to 100% in all planes to aid in completion of ADLs.                  Status at IE:75% in extension and left rotation                   Current: Same as IE     Long Term Goals: To be accomplished in 4 weeks:                   Patient will increase cervical strength to 5/5 MMT throughout to aid in completion of ADLs.                  Status at IE:4/5 flexion and extension                   Current: Same as IE                      Patient will report pain no greater than 1-2/10 throughout entire day to aid in completion of ADLs.                  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                  Status at [facility] raising left arm overhead due to recent surgery                   Current: Same as IE                      Patient will improve FOTO score to 66 points to demonstrate improvement in functional status.                    Status at IE:57                   Current: Same as IE              *FOTO score is an established functional score where 100 = no disability*    PLAN  []  Upgrade activities as tolerated     [x]  Continue plan of care  []  Update interventions per flow sheet       []  Discharge due to:_  []  Other:_      Kyle Whittington PT, DPT 11/23/2020  11:20 AM    Future Appointments   Date Time Provider Kolby Ruiz   11/23/2020  1:00 PM Nadine Watson PT, DPT [facility]   11/25/2020  8:00 AM Nadine Watson PT, DPT [facility]   11/25/2020  8:45 AM Nadine Watson PT, DPT [facility]   11/30/2020 10:15 AM Nadine Watson PT, DPT [facility]   11/30/2020 11:00 AM Mo Lopez PT, DPT [facility]   12/2/2020  8:00 AM Mo Lopez PT, DPT [facility]   12/2/2020  8:45 AM Mo Lopez PT, DPT [facility]   12/8/2020 10:15 AM Mo Lopez PT, DPT [facility]   12/8/2020 11:00 AM Mo Lopez PT, DPT [facility]   12/9/2020  8:00 AM Mo Lopez PT, DPT [facility]   12/9/2020  8:45 AM Mo Lopez PT, DPT [facility]   12/14/2020 10:15 AM Mo Lopez PT, DPT [facility]   12/14/2020 11:00 AM Mo Lopez PT, DPT [facility]   12/16/2020  8:00 AM Mo Lopez PT, DPT [facility]   12/16/2020  8:45 AM Mo Lopez PT, DPT [facility]

## 2020-11-25 ENCOUNTER — HOSPITAL ENCOUNTER (OUTPATIENT)
Dept: PHYSICAL THERAPY | Age: 50
Discharge: HOME | End: 2020-11-25
Payer: COMMERCIAL

## 2020-11-25 PROCEDURE — 97110 THERAPEUTIC EXERCISES: CPT | Performed by: PHYSICAL THERAPIST

## 2020-11-25 NOTE — PROGRESS NOTES
PT DAILY TREATMENT NOTE    Patient Name: Casimiro Burgos  Date:2020  : 1970  [x]  Patient  Verified  Payor: Jan Connell / [place]: [place] [employer] / Product Type: PPO /    In time:9:00  Out time:9:32  Total Treatment Time (min): 32  Total Timed Codes (min): 32  1:1 Treatment Time ( only): 32   Visit #: 5 of 12    Treatment Area: Neck pain [M54.2]    SUBJECTIVE  Pain Level (0-10 scale): 0  Any medication changes, allergies to medications, adverse drug reactions, diagnosis change, or new procedure performed?: [x] No    [] Yes (see summary sheet for update)  Subjective functional status/changes:   [] No changes reported  Feels good. No new issues. OBJECTIVE    32 min Therapeutic Exercise:  [x] See flow sheet :   Rationale: increase ROM, increase strength and decrease pain to improve the patients ability to complete ADLs    With   [] TE   [] TA   [] neuro   [] other: Patient Education: [x] Review HEP    [] Progressed/Changed HEP based on:   [] positioning   [] body mechanics   [] transfers   [] heat/ice application    [] other:      Other Objective/Functional Measures: NA     Pain Level (0-10 scale) post treatment: 0-    ASSESSMENT/Changes in Function: Patient responds well to treatment session. Patient is progressing well. Minimal difficulty with exercises as prescribed.  . No adverse effects were noted from today's treatment session       Patient will continue to benefit from skilled PT services to modify and progress therapeutic interventions, address functional mobility deficits, address ROM deficits, address strength deficits, analyze and address soft tissue restrictions, analyze and cue movement patterns, analyze and modify body mechanics/ergonomics, assess and modify postural abnormalities    []  See Plan of Care  []  See progress note/recertification  []  See Discharge Summary         Progress towards goals / Updated goals:  Short Term Goals: To be accomplished in 2 weeks:                   Patient will report compliance with HEP 1x/day to aid in rehabilitation program.                   Status at IE:NA                   Current:  In-progress, introduced cervical isometric to manage symptoms 11/16/2020                      Patient will increase cervical ROM to 100% in all planes to aid in completion of ADLs.                  Status at IE:75% in extension and left rotation                   Current: Same as IE     Long Term Goals: To be accomplished in 4 weeks:                   Patient will increase cervical strength to 5/5 MMT throughout to aid in completion of ADLs.                  Status at IE:4/5 flexion and extension                   Current: Same as IE                      Patient will report pain no greater than 1-2/10 throughout entire day to aid in completion of ADLs.                  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                  Status at [facility] raising left arm overhead due to recent surgery                   Current: Same as IE                      Patient will improve FOTO score to 66 points to demonstrate improvement in functional status.                    Status at IE:57                   Current: Same as IE              *FOTO score is an established functional score where 100 = no disability*    PLAN  []  Upgrade activities as tolerated     [x]  Continue plan of care  []  Update interventions per flow sheet       []  Discharge due to:_  []  Other:_      Benitez Lee PT, DPT 11/25/2020  9:39 AM    Future Appointments   Date Time Provider Kolby Ruiz   11/30/2020 10:15 AM Chalo Burgos PT, DPT [facility]   11/30/2020 11:00 AM Chalo Burgos PT, DPT [facility]   12/2/2020  8:00 AM Chalo Burgos PT, DPT [facility]   12/2/2020  8:45 AM Chalo Burgos PT, DPT [facility]   12/8/2020 10:15 AM Chalo Burgos PT, DPT [facility] 12/8/2020 11:00 AM Marcus Chelly, PT, DPT [facility]   12/9/2020  8:00 AM Marcus Chelly, PT, DPT [facility]   12/9/2020  8:45 AM Marcus Chelly, PT, DPT [facility]   12/14/2020 10:15 AM Marcus Chelly, PT, DPT [facility]   12/14/2020 11:00 AM Marcus Chelly, PT, DPT [facility]   12/16/2020  8:00 AM Marcus Chelly, PT, DPT [facility]   12/16/2020  8:45 AM Marcus Chelly, PT, DPT [facility]

## 2020-11-25 NOTE — PROGRESS NOTES
PT DAILY TREATMENT NOTE    Patient Name: Jenna Pate  Date:2020  : 1970  [x]  Patient  Verified  Payor: [employer] / Plan: [employer] P[employer] / Product Type: PPO /    In time:8:25  Out time:9:00  Total Treatment Time (min): 35  Total Timed Codes (min): 35  1:1 Treatment Time ( only): 35   Visit #: 8 of 12    Treatment Area: Left shoulder pain [M25.512]    SUBJECTIVE  Pain Level (0-10 scale): 0.5  Any medication changes, allergies to medications, adverse drug reactions, diagnosis change, or new procedure performed?: [x] No    [] Yes (see summary sheet for update)  Subjective functional status/changes:   [] No changes reported  Feels good only a little discomfort      OBJECTIVE    35 min Therapeutic Exercise:  [x] See flow sheet :   Rationale: increase ROM, increase strength and decrease pain to improve the patients ability to complete ADLs       With   [] TE   [] TA   [] neuro   [] other: Patient Education: [x] Review HEP    [] Progressed/Changed HEP based on:   [] positioning   [] body mechanics   [] transfers   [] heat/ice application    [] other:      Other Objective/Functional Measures: NA     Pain Level (0-10 scale) post treatment: 0    ASSESSMENT/Changes in Function: Patient responds well to treatment session. Patient is progressing well. Minimal difficulty with exercises as prescribed.  . No adverse effects were noted from today's treatment session     Patient will continue to benefit from skilled PT services to modify and progress therapeutic interventions, address functional mobility deficits, address ROM deficits, address strength deficits, analyze and address soft tissue restrictions, analyze and cue movement patterns, analyze and modify body mechanics/ergonomics, assess and modify postural abnormalities    []  See Plan of Care  []  See progress note/recertification  []  See Discharge Summary         Progress towards goals / Updated goals:  Short Term Goals: To be accomplished in 2 weeks:                   SNTGANH will report compliance with HEP at least 1x/day to aid in rehabilitation program.                   Status at IE: NA                   Current: Reports compliance, metk 11/6/2020                      Patient will display full pain free AAROM into flexin to aid in completion of ADLs.                  Status at IE: AROM 0-45 flex                   Current: 0-140 flexion, Met 11/16/2020        Long Term Goals: To be accomplished in 6 weeks:                   Patient will report compliance with HEP a least 3-4x/week to aid in rehabilitation/strengthening program.                   Status at IE: NA                   Current: Same as IE                      Patient will report no pain greater than 1-2/10 with overhead activities to aid in completion of ADLs.                  Status at IE: 3-5                   Current: 3/10, progressing 11/25/2020                      Patient will increase B UE strength to 5/5 throughout all planes to aid in completion of ADLs.                  Status at IE: 4-/5                    Current: Same as IE                      Patient will increase FOTO score to 67 points overall to demonstrate improvement in functional status.                     Status at IE: 28                   Current: Same as IE              *FOTO score is an established functional score where 100 = no disability*    PLAN  []  Upgrade activities as tolerated     [x]  Continue plan of care  []  Update interventions per flow sheet       []  Discharge due to:_  []  Other:_      Hernando Bishop, PT, DPT 11/25/2020  8:34 AM    Future Appointments   Date Time Provider Kolby Ruiz   11/25/2020  8:45 AM Fidencio Taylor PT, DPT [facility]   11/30/2020 10:15 AM Fidencio Taylor PT, DPT [facility]   11/30/2020 11:00 AM Fidencio Taylor PT, DPT [facility]   12/2/2020  8:00 AM Fidencio Taylor PT, DPT [facility]   12/2/2020  8:45 AM Fidencio Taylor, PT, DPT [facility]   12/8/2020 10:15 AM Juvenal Napoles Amari Rowe, DPT [facility]   12/8/2020 11:00 AM Frosty Pennyark, PT, DPT [facility]   12/9/2020  8:00 AM Frosty Pennyark, PT, DPT [facility]   12/9/2020  8:45 AM Frosty Pennyark, PT, DPT [facility]   12/14/2020 10:15 AM Frosty Pennyark, PT, DPT [facility]   12/14/2020 11:00 AM Frosty Pennyark, PT, DPT [facility]   12/16/2020  8:00 AM Frosty Pennyark, PT, DPT [facility]   12/16/2020  8:45 AM Frosty Pennyark, PT, DPT [facility]

## 2020-11-30 ENCOUNTER — HOSPITAL ENCOUNTER (OUTPATIENT)
Dept: PHYSICAL THERAPY | Age: 50
Discharge: HOME | End: 2020-11-30
Payer: COMMERCIAL

## 2020-11-30 PROCEDURE — 97110 THERAPEUTIC EXERCISES: CPT | Performed by: PHYSICAL THERAPIST

## 2020-11-30 NOTE — PROGRESS NOTES
PT DAILY TREATMENT NOTE    Patient Name: Arta Runner  Date:2020  : 1970  [x]  Patient  Verified  Payor: Yobani Nunes / Plan: [place] [employer] / Product Type: PPO /    In time:10:20  Out time:10:45  Total Treatment Time (min): 25  Total Timed Codes (min): 25   Visit #: 9 of 12    Treatment Area: Left shoulder pain [M25.512]    SUBJECTIVE  Pain Level (0-10 scale): 1  Any medication changes, allergies to medications, adverse drug reactions, diagnosis change, or new procedure performed?: [x] No    [] Yes (see summary sheet for update)  Subjective functional status/changes:   [] No changes reported  Feels good. The shoulder is getting better. I notice I can reach overhead into cabinets     OBJECTIVE    25 min Therapeutic Exercise:  [x] See flow sheet :   Rationale: increase ROM, increase strength and decrease pain to improve the patients ability to complete ADLs       With   [] TE   [] TA   [] neuro   [] other: Patient Education: [x] Review HEP    [] Progressed/Changed HEP based on:   [] positioning   [] body mechanics   [] transfers   [] heat/ice application    [] other:      Other Objective/Functional Measures: NA     Pain Level (0-10 scale) post treatment: 0    ASSESSMENT/Changes in Function: Patient responds well to treatment session. Patient is progressing well. Is able to complete ROM WNL in supine, indicates her AROM limitations are mainly strength and comfort limited at this time. Will progress as tolerated.  No adverse effects were noted from today's treatment session       Patient will continue to benefit from skilled PT services to modify and progress therapeutic interventions, address functional mobility deficits, address ROM deficits, address strength deficits, analyze and address soft tissue restrictions, analyze and cue movement patterns, analyze and modify body mechanics/ergonomics, assess and modify postural abnormalities    []  See Plan of Care  []  See progress note/recertification  []  See Discharge Summary         Progress towards goals / Updated goals:  Short Term Goals: To be accomplished in 2 weeks:                   KIGDWKS will report compliance with HEP at least 1x/day to aid in rehabilitation program.                   Status at IE: NA                   Current: Reports compliance, metk 11/6/2020                      Patient will display full pain free AAROM into flexin to aid in completion of ADLs.                  Status at IE: AROM 0-45 flex                   Current: 0-140 flexion, Met 11/16/2020        Long Term Goals: To be accomplished in 6 weeks:                   Patient will report compliance with HEP a least 3-4x/week to aid in rehabilitation/strengthening program.                   Status at IE: NA                   Current: Same as IE                      Patient will report no pain greater than 1-2/10 with overhead activities to aid in completion of ADLs.                  Status at IE: 3-5                   Current: 3/10, progressing 11/25/2020                      Patient will increase B UE strength to 5/5 throughout all planes to aid in completion of ADLs.                  Status at IE: 4-/5                    Current: Same as IE                      Patient will increase FOTO score to 67 points overall to demonstrate improvement in functional status.                     Status at IE: 28                   Current: Same as IE              *FOTO score is an established functional score where 100 = no disability*    PLAN  []  Upgrade activities as tolerated     [x]  Continue plan of care  []  Update interventions per flow sheet       []  Discharge due to:_  []  Other:_      Vikas Patches, PT, DPT 11/30/2020  10:29 AM    Future Appointments   Date Time Provider Kolby Ruiz   11/30/2020 11:00 AM Mo Lopez PT, DPT [facility]   12/2/2020  8:00 AM Mo Lopez PT, DPT [facility]   12/2/2020  8:45 AM Mo Lopez PT, DPT [facility]   12/8/2020 10:15 AM Mo Lopez PT, DPT [facility]   12/8/2020 11:00 AM Mo Lopez PT, DPT [facility]   12/9/2020  8:00 AM Mo Lopez PT, DPT [facility]   12/9/2020  8:45 AM Mo Lopze PT, DPT [facility]   12/14/2020 10:15 AM Mo Lopez PT, DPT [facility]   12/14/2020 11:00 AM oM Lopez PT, DPT [facility]   12/16/2020  8:00 AM Mo Lopez PT, DPT [facility]   12/16/2020  8:45 AM Mo Lopez PT, DPT [facility]

## 2020-11-30 NOTE — PROGRESS NOTES
PT DAILY TREATMENT NOTE    Patient Name: Peyton Salazar  Date:2020  : 1970  [x]  Patient  Verified  Payor: Nuria Rolon / Plan: [place] [employer] [place]PO / Product Type: PPO /    In time:10:45  Out time:11:17  Total Treatment Time (min): 33  Total Timed Codes (min): 33   Visit #: 6 of 12    Treatment Area: Neck pain [M54.2]    SUBJECTIVE  Pain Level (0-10 scale): 0  Any medication changes, allergies to medications, adverse drug reactions, diagnosis change, or new procedure performed?: [x] No    [] Yes (see summary sheet for update)  Subjective functional status/changes:   [] No changes reported  The neck has been feeling good. Haven't really had any pain since my last shot. OBJECTIVE    33 min Therapeutic Exercise:  [x] See flow sheet :   Rationale: increase ROM, increase strength and decrease pain to improve the patients ability to complete ADLs         With   [] TE   [] TA   [] neuro   [] other: Patient Education: [x] Review HEP    [] Progressed/Changed HEP based on:   [] positioning   [] body mechanics   [] transfers   [] heat/ice application    [] other:      Other Objective/Functional Measures: NA     Pain Level (0-10 scale) post treatment: 0    ASSESSMENT/Changes in Function: Patient responds well to treatment session. Patient is progressing well. Discussed progressing through to discharge today. Patient was receptive .  No adverse effects were noted from today's treatment session       Patient will continue to benefit from skilled PT services to modify and progress therapeutic interventions, address functional mobility deficits, address ROM deficits, address strength deficits, analyze and address soft tissue restrictions, analyze and cue movement patterns, analyze and modify body mechanics/ergonomics, assess and modify postural abnormalities    []  See Plan of Care  []  See progress note/recertification  []  See Discharge Summary         Progress towards goals / Updated goals:  Short Term Goals: To be accomplished in 2 weeks:                   Patient will report compliance with HEP 1x/day to aid in rehabilitation program.                   Status at IE:NA                   Current:  In-progress, introduced cervical isometric to manage symptoms 11/16/2020                      Patient will increase cervical ROM to 100% in all planes to aid in completion of ADLs.                  Status at IE:75% in extension and left rotation                   Current: Same as IE     Long Term Goals: To be accomplished in 4 weeks:                   Patient will increase cervical strength to 5/5 MMT throughout to aid in completion of ADLs.                  Status at IE:4/5 flexion and extension                   Current: Same as IE                      Patient will report pain no greater than 1-2/10 throughout entire day to aid in completion of ADLs.                  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                  Status at [facility] raising left arm overhead due to recent surgery                   Current: Same as IE                      Patient will improve FOTO score to 66 points to demonstrate improvement in functional status.                    Status at IE:57                   Current: Same as IE              *FOTO score is an established functional score where 100 = no disability*    PLAN  []  Upgrade activities as tolerated     [x]  Continue plan of care  []  Update interventions per flow sheet       []  Discharge due to:_  []  Other:_      Tigre Pedro PT, DPT 11/30/2020  11:02 AM    Future Appointments   Date Time Provider Kolby Ruiz   12/2/2020  8:00 AM Maria Luz Wang PT, DPT [facility]   12/2/2020  8:45 AM Maria Luz Wang PT, DPT [facility]   12/8/2020 10:15 AM Maria Luz Wang PT, DPT [facility]   12/8/2020 11:00 AM Maria Luz Wang PT, DPT [facility]   12/9/2020 8:00 AM Vitaly Danielson PT, DPT [facility]   12/9/2020  8:45 AM Vitaly Danielson PT, DPT [facility]   12/14/2020 10:15 AM Vitaly Danielson PT, DPT [facility]   12/14/2020 11:00 AM Vitaly Danielson PT, DPT [facility]   12/16/2020  8:00 AM Vitaly Danielson PT, DPT [facility]   12/16/2020  8:45 AM Vitaly Danielson PT, DPT [facility]

## 2020-12-02 ENCOUNTER — HOSPITAL ENCOUNTER (OUTPATIENT)
Dept: PHYSICAL THERAPY | Age: 50
Discharge: HOME | End: 2020-12-02
Payer: COMMERCIAL

## 2020-12-02 PROCEDURE — 97110 THERAPEUTIC EXERCISES: CPT | Performed by: PHYSICAL THERAPIST

## 2020-12-02 NOTE — PROGRESS NOTES
PT DAILY TREATMENT NOTE    Patient Name: Arta Runner  Date:2020  : 1970  [x]  Patient  Verified  Payor: Yobani Nunes / Plan: [place] [employer] / Product Type: PPO /    In time:8:05  Out time:8:40  Total Treatment Time (min): 35  Total Timed Codes (min): 35   Visit #: 10 of 12    Treatment Area: Left shoulder pain [M25.512]    SUBJECTIVE  Pain Level (0-10 scale): 0  Any medication changes, allergies to medications, adverse drug reactions, diagnosis change, or new procedure performed?: [x] No    [] Yes (see summary sheet for update)  Subjective functional status/changes:   [] No changes reported  Feels good. No new issues. OBJECTIVE    35 min Therapeutic Exercise:  [x] See flow sheet :   Rationale: increase ROM, increase strength and decrease pain to improve the patients ability to complete ADLs         With   [] TE   [] TA   [] neuro   [] other: Patient Education: [x] Review HEP    [] Progressed/Changed HEP based on:   [] positioning   [] body mechanics   [] transfers   [] heat/ice application    [] other:      Other Objective/Functional Measures: NA     Pain Level (0-10 scale) post treatment: 0    ASSESSMENT/Changes in Function: Patient responds well to treatment session. Patient is progressing well. Minimal difficulty with exercises as prescribed.  No adverse effects were noted from today's treatment session    Patient will continue to benefit from skilled PT services to modify and progress therapeutic interventions, address functional mobility deficits, address ROM deficits, address strength deficits, analyze and address soft tissue restrictions, analyze and cue movement patterns, analyze and modify body mechanics/ergonomics, assess and modify postural abnormalities    []  See Plan of Care  []  See progress note/recertification  []  See Discharge Summary         Progress towards goals / Updated goals:  Short Term Goals: To be accomplished in 2 weeks:                   Patient will report compliance with HEP at least 1x/day to aid in rehabilitation program.                   Status at IE: NA                   Current: Reports compliance, metk 11/6/2020                      Patient will display full pain free AAROM into flexin to aid in completion of ADLs.                  Status at IE: AROM 0-45 flex                   Current: 0-140 flexion, Met 11/16/2020        Long Term Goals: To be accomplished in 6 weeks:                   Patient will report compliance with HEP a least 3-4x/week to aid in rehabilitation/strengthening program.                   Status at IE: NA                   Current: Same as IE                      Patient will report no pain greater than 1-2/10 with overhead activities to aid in completion of ADLs.                  Status at IE: 3-5                   Current: 3/10, progressing 11/25/2020                      Patient will increase B UE strength to 5/5 throughout all planes to aid in completion of ADLs.                  Status at IE: 4-/5                    Current: Same as IE                      Patient will increase FOTO score to 67 points overall to demonstrate improvement in functional status.                     Status at IE: 28                   Current: Same as IE              *FOTO score is an established functional score where 100 = no disability*    PLAN  []  Upgrade activities as tolerated     [x]  Continue plan of care  []  Update interventions per flow sheet       []  Discharge due to:_  []  Other:_      Tigre Pedro PT, DPT 12/2/2020  8:18 AM    Future Appointments   Date Time Provider Kolby Ruiz   12/2/2020  8:45 AM Maria Luz Wang PT, DPT [facility]   12/8/2020 10:15 AM Maria Luz Wang PT, DPT [facility]   12/8/2020 11:00 AM Maria Luz Wang PT, DPT [facility]   12/9/2020  8:00 AM Maria Luz Wang PT, DPT [facility]   12/9/2020  8:45 AM Maria Luz Wang PT, DPT [facility]   12/14/2020 10:15 AM Maria Luz Wang PT, DPT [facility]   12/14/2020 11:00 AM Francisco Walsh Jerica Cedillo DPT [facility]   12/16/2020  8:00 AM Maria Luz Wang PT, LAURA GALARZA [facility]   12/16/2020  8:45 AM Maria Luz Wang PT, LAURA [facility]

## 2020-12-02 NOTE — PROGRESS NOTES
PT DAILY TREATMENT NOTE    Patient Name: Arta Runner  Date:2020  : 1970  [x]  Patient  Verified  Payor: Yobani Nunes / Plan: [place] [employer] [place] / Product Type: PPO /    In time:8:40  Out time:9:11  Total Treatment Time (min): 31  Total Timed Codes (min): 31   Visit #: 7 of 12    Treatment Area: Neck pain [M54.2]    SUBJECTIVE  Pain Level (0-10 scale): 0  Any medication changes, allergies to medications, adverse drug reactions, diagnosis change, or new procedure performed?: [x] No    [] Yes (see summary sheet for update)  Subjective functional status/changes:   [] No changes reported  Feels good. OBJECTIVE      31 min Therapeutic Exercise:  [x] See flow sheet :   Rationale: increase ROM, increase strength and decrease pain to improve the patients ability to complete ADLs         With   [] TE   [] TA   [] neuro   [] other: Patient Education: [x] Review HEP    [] Progressed/Changed HEP based on:   [] positioning   [] body mechanics   [] transfers   [] heat/ice application    [] other:      Other Objective/Functional Measures: NA     Pain Level (0-10 scale) post treatment: 0    ASSESSMENT/Changes in Function: Patient responds well to treatment session. Patient is progressing well. Minimal difficulty with exercises as prescribed.  . No adverse effects were noted from today's treatment session       Patient will continue to benefit from skilled PT services to modify and progress therapeutic interventions, address functional mobility deficits, address ROM deficits, address strength deficits, analyze and address soft tissue restrictions, analyze and cue movement patterns, analyze and modify body mechanics/ergonomics, assess and modify postural abnormalities    []  See Plan of Care  []  See progress note/recertification  []  See Discharge Summary         Progress towards goals / Updated goals:  Short Term Goals: To be accomplished in 2 weeks:                   Patient will report compliance with HEP at least 1x/day to aid in rehabilitation program.                   Status at IE: NA                   Current: Reports compliance, metk 11/6/2020                      Patient will display full pain free AAROM into flexin to aid in completion of ADLs.                  Status at IE: AROM 0-45 flex                   Current: 0-140 flexion, Met 11/16/2020        Long Term Goals: To be accomplished in 6 weeks:                   Patient will report compliance with HEP a least 3-4x/week to aid in rehabilitation/strengthening program.                   Status at IE: NA                   Current: Same as IE                      Patient will report no pain greater than 1-2/10 with overhead activities to aid in completion of ADLs.                  Status at IE: 3-5                   Current: 3/10, progressing 11/25/2020                      Patient will increase B UE strength to 5/5 throughout all planes to aid in completion of ADLs.                  Status at IE: 4-/5                    Current: Same as IE                      Patient will increase FOTO score to 67 points overall to demonstrate improvement in functional status.                     Status at IE: 28                   Current: Same as IE              *FOTO score is an established functional score where 100 = no disability*    PLAN  []  Upgrade activities as tolerated     [x]  Continue plan of care  []  Update interventions per flow sheet       []  Discharge due to:_  []  Other:_      Luther Chang PT, DPT 12/2/2020  8:40 AM    Future Appointments   Date Time Provider Kolby Ruiz   12/2/2020  8:45 AM Reji Couch PT, DPT [facility]   12/8/2020 10:15 AM Reji Couch PT, DPT [facility]   12/8/2020 11:00 AM Reji Couch PT, DPT [facility]   12/9/2020  8:00 AM Reji Couch PT, DPT [facility]   12/9/2020  8:45 AM Reji Couch PT, DPT [facility]   12/14/2020 10:15 AM Reji Couch PT, DPT [facility]   12/14/2020 11:00 AM Reji Couch PT, DPT [facility]   12/16/2020  8:00 AM Maximilian Saleh PT, LAURA [facility]   12/16/2020  8:45 AM Maximilian Saleh PT, LAURA [facility]

## 2020-12-07 ENCOUNTER — APPOINTMENT (OUTPATIENT)
Dept: PHYSICAL THERAPY | Age: 50
End: 2020-12-07
Payer: COMMERCIAL

## 2020-12-08 ENCOUNTER — HOSPITAL ENCOUNTER (OUTPATIENT)
Dept: PHYSICAL THERAPY | Age: 50
Discharge: HOME | End: 2020-12-08
Payer: COMMERCIAL

## 2020-12-08 PROCEDURE — 97110 THERAPEUTIC EXERCISES: CPT | Performed by: PHYSICAL THERAPIST

## 2020-12-08 NOTE — PROGRESS NOTES
PT DAILY TREATMENT NOTE    Patient Name: Rosa Maria Curry  Date:2020  : 1970  [x]  Patient  Verified  Payor: Jolly Agustin / Plan: [place] [employer] [place] / Product Type: PPO /    In time:11:01  Out time:11:29  Total Treatment Time (min): 28  Total Timed Codes (min): 28   Visit #: 8 of 12    Treatment Area: Neck pain [M54.2]    SUBJECTIVE  Pain Level (0-10 scale): 0  Any medication changes, allergies to medications, adverse drug reactions, diagnosis change, or new procedure performed?: [x] No    [] Yes (see summary sheet for update)  Subjective functional status/changes:   [] No changes reported  Feels good. No new issues. OBJECTIVE    28 min Therapeutic Exercise:  [x] See flow sheet :   Rationale: increase ROM, increase strength and decrease pain to improve the patients ability to complete ADLs         With   [] TE   [] TA   [] neuro   [] other: Patient Education: [x] Review HEP    [] Progressed/Changed HEP based on:   [] positioning   [] body mechanics   [] transfers   [] heat/ice application    [] other:      Other Objective/Functional Measures: NA     Pain Level (0-10 scale) post treatment: 0    ASSESSMENT/Changes in Function: Patient responds well to treatment session. Patient is progressing well. Has met most goals today .  No adverse effects were noted from today's treatment session       Patient will continue to benefit from skilled PT services to modify and progress therapeutic interventions, address functional mobility deficits, address ROM deficits, address strength deficits, analyze and address soft tissue restrictions, analyze and cue movement patterns, analyze and modify body mechanics/ergonomics, assess and modify postural abnormalities    []  See Plan of Care  []  See progress note/recertification  []  See Discharge Summary         Progress towards goals / Updated goals:  Short Term Goals: To be accomplished in 2 weeks:                   Patient will report compliance with HEP 1x/day to aid in rehabilitation program.                   Status at IE:NA                   Current:  In-progress, introduced cervical isometric to manage symptoms 11/16/2020                      Patient will increase cervical ROM to 100% in all planes to aid in completion of ADLs.                  Status at IE:75% in extension and left rotation                   Current: C-AROM 100%, met 12/8/2020     Long Term Goals: To be accomplished in 4 weeks:                   Patient will increase cervical strength to 5/5 MMT throughout to aid in completion of ADLs.                  Status at IE:4/5 flexion and extension                   Current: 5/5 grossly. Met 12/8/2020                      Patient will report pain no greater than 1-2/10 throughout entire day to aid in completion of ADLs.                  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                  Status at [facility] raising left arm overhead due to recent surgery                   Current: limited still secondary to shoulder surgery, met 12/8/2020                      Patient will improve FOTO score to 66 points to demonstrate improvement in functional status.                    Status at IE:57                   Current: will capture at via email 12/8/2020                *FOTO score is an established functional score where 100 = no disability*    PLAN  []  Upgrade activities as tolerated     [x]  Continue plan of care  []  Update interventions per flow sheet       []  Discharge due to:_  []  Other:_      Shay Albright PT, DPT 12/8/2020  11:12 AM    Future Appointments   Date Time Provider Kolby Ruiz   12/11/2020  1:00 PM Maximilian Saleh PT, DPT [facility]   12/11/2020  1:45 PM Maximilian Saleh PT, DPT [facility]   12/14/2020 10:15 AM Maximilian Saleh PT, DPT [facility]   12/14/2020 11:00 AM Maximilian Saleh PT, DPT [facility]   12/16/2020 8:00 AM Junior Radha PT, LAURA GALARZA [facility]   12/16/2020  8:45 AM Junior Radha PT, LAURA GALARZA [facility]

## 2020-12-08 NOTE — PROGRESS NOTES
[facility] at [address] [place] [address] Drive: [phone]   Fax: [phone]  Progress Note  Patient Name: Tricia Abebe : 1970   Medical   Diagnosis: Left shoulder pain [M25.512] Treatment Diagnosis: Left shoulder pain   Onset Date: 10/26/2020     Referral Source: Osbaldo Lopez MD [place] [facility]): 2020   Prior Hospitalization: See medical history Provider #: [id number]   Prior Level of Function: Active, weight lifting, independent w/ ADLs   Comorbidities: OA   Medications: Verified on Patient Summary List      ===========================================================================================  Assessment / Summary of Care:  Tricia Abebe is a 44 y.o.  yo female who is now 6wks s/p left shoulder SAD on 10/26/2020. She is making good progress towards goals. Continues to have some weakness affecting function. This is partially due to pain. No adverse effects were noted from today's treatment session  Patient will continue to benefit from skilled PT services to modify and progress therapeutic interventions, address functional mobility deficits, address ROM deficits, address strength deficits, analyze and address soft tissue restrictions, analyze and cue movement patterns, analyze and modify body mechanics/ergonomics, assess and modify postural abnormalities    ===========================================================================================    Plan:Continue therapy per initial plan/protocol at a frequency of  2 x per week for 4 weeks    Goals:   Short Term Goals: To be accomplished in 2 weeks:                   Patient will report compliance with HEP at least 1x/day to aid in rehabilitation program.                   Status at IE: NA                   Current: Reports compliance, metk 2020                      Patient will display full pain free AAROM into flexin to aid in completion of ADLs.                    Status at IE: AROM 0-45 flex                   Current: 0-140 flexion, Met 11/16/2020        Long Term Goals: To be accomplished in 6 weeks:                   Patient will report compliance with HEP a least 3-4x/week to aid in rehabilitation/strengthening program.                   Status at IE: NA                   Current: Same as IE                      Patient will report no pain greater than 1-2/10 with overhead activities to aid in completion of ADLs.                  Status at IE: 3-5                   Current: 3/10, progressing 11/25/2020                      Patient will increase B UE strength to 5/5 throughout all planes to aid in completion of ADLs.                  Status at IE: 4-/5                    Current:5/5 grossly, except 4/5 in abduction, flexion, elbow extension 12/8/2020                      Patient will increase FOTO score to 67 points overall to demonstrate improvement in functional status.                  Status at IE: 28                   Current: Same as IE              *FOTO score is an established functional score where 100 = no disability*    ===========================================================================================  Subjective: Feels good. The shoulder is getting better.  I keep finding that I can do things that I couldn't do      Objective: 5/5 grossly, except 4/5 in abduction, flexion, elbow extension    Therapist Signature: Leda Brooke PT, DPT, OCS Date: 98/9/1885   Re-Certification: NA Time: 3:18 PM       Physician's Signature:____________  Date:_________TIME:________    [employer], Date and Time must be completed for valid certification **    [facility] at [address]         Phone: [phone]   Fax: [phone]

## 2020-12-08 NOTE — PROGRESS NOTES
[facility] at [address] [place] [address] Drive: [phone]   Fax: [phone]  Discharge Summary  Patient Name: Celestino Arrington : 1970   Medical   Diagnosis: Neck pain [M54.2] Treatment Diagnosis: Neck pain   Onset Date: chronic     Referral Source: iNlsa Mckeon MD [facility]): 2020   Prior Hospitalization: See medical history Provider #: [id number]   Prior Level of Function: Active, independent w/ ADLs   Comorbidities: OA   Medications: Verified on Patient Summary List      ===========================================================================================  Assessment / Summary of Care:  Celestino Arrington is a 44 y.o.  yo female with chronic neck pain and migraines. She has benefited greatly from her initial injection. With minimal to no pain since that injection. We supplied the patient with mobility and strengthening routine. She has been able to complete these exercises with minimal assistance. Is now ready for discharge to independent [facility].     ===========================================================================================    Plan: Discharge to independent [facility]. Goals:   Short Term Goals: To be accomplished in 2 weeks:                   HYNIFYQ will report compliance with HEP 1x/day to aid in rehabilitation program.                   Status at IE:NA                   Current:  In-progress, introduced cervical isometric to manage symptoms 2020                      Patient will increase cervical ROM to 100% in all planes to aid in completion of ADLs.                  Status at IE:75% in extension and left rotation                   Current: C-AROM 100%, met 2020     Long Term Goals: To be accomplished in 4 weeks:                   Patient will increase cervical strength to 5/5 MMT throughout to aid in completion of ADLs.                    Status at IE:4/5 flexion and extension                   Current: 5/5 grossly. Met 12/8/2020                      Patient will report pain no greater than 1-2/10 throughout entire day to aid in completion of ADLs.                  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                  Status at [address] [facility] raising left arm overhead due to recent surgery                   Current: limited still secondary to shoulder surgery, met 12/8/2020                      Patient will improve FOTO score to 66 points to demonstrate improvement in functional status.                  Status at IE:57                   Current: will capture at via email 12/8/2020                *FOTO score is an established functional score where 100 = no disability*    ===========================================================================================  Subjective: Feels good. Ready to be discharged.        Objective: MMT 5/5 in cervical musculature    Therapist Signature: Ruth Covington PT, DPT, OCS Date: 12/8/2020     Time: 11:43 AM

## 2020-12-08 NOTE — PROGRESS NOTES
PT DAILY TREATMENT NOTE    Patient Name: Dhaval Nolen  Date:2020  : 1970  [x]  Patient  Verified  Payor: Beth Van / [place]: [place] [employer] / Product Type: PPO /    In time:10:18  Out time:11:00  Total Treatment Time (min): 42  Total Timed Codes (min): 42  1:1 Treatment Time ( only): 42   Visit #: 11 of 12    Treatment Area: Left shoulder pain [M25.512]    SUBJECTIVE  Pain Level (0-10 scale): 0  Any medication changes, allergies to medications, adverse drug reactions, diagnosis change, or new procedure performed?: [x] No    [] Yes (see summary sheet for update)  Subjective functional status/changes:   [] No changes reported  Feels good. The shoulder is getting better. I keep finding that I can do things that I couldn't do    OBJECTIVE    42 min Therapeutic Exercise:  [x] See flow sheet :   Rationale: increase ROM, increase strength and decrease pain to improve the patients ability to complete ADLs       With   [] TE   [] TA   [] neuro   [] other: Patient Education: [x] Review HEP    [] Progressed/Changed HEP based on:   [] positioning   [] body mechanics   [] transfers   [] heat/ice application    [] other:      Other Objective/Functional Measures: MMT 5/5, except left shoulder abd/flex and tricep extension     Pain Level (0-10 scale) post treatment: 0    ASSESSMENT/Changes in Function: Patient responds well to treatment session. Patient is now 6wks s/p left shoulder SAD on 10/26/2020. She is making good progress towards goals. Continues to have some weakness affecting function. This is partially due to pain.   . No adverse effects were noted from today's treatment session       Patient will continue to benefit from skilled PT services to modify and progress therapeutic interventions, address functional mobility deficits, address ROM deficits, address strength deficits, analyze and address soft tissue restrictions, analyze and cue movement patterns, analyze and modify body mechanics/ergonomics, assess and modify postural abnormalities    []  See Plan of Care  []  See progress note/recertification  []  See Discharge Summary         Progress towards goals / Updated goals:  Short Term Goals: To be accomplished in 2 weeks:                   COLE will report compliance with HEP at least 1x/day to aid in rehabilitation program.                   Status at IE: NA                   Current: Reports compliance, metk 11/6/2020                      Patient will display full pain free AAROM into flexin to aid in completion of ADLs.                  Status at IE: AROM 0-45 flex                   Current: 0-140 flexion, Met 11/16/2020        Long Term Goals: To be accomplished in 6 weeks:                   Patient will report compliance with HEP a least 3-4x/week to aid in rehabilitation/strengthening program.                   Status at IE: NA                   Current: Same as IE                      Patient will report no pain greater than 1-2/10 with overhead activities to aid in completion of ADLs.                  Status at IE: 3-5                   Current: 3/10, progressing 11/25/2020                      Patient will increase B UE strength to 5/5 throughout all planes to aid in completion of ADLs.                  Status at IE: 4-/5                    Current:5/5 grossly, except 4/5 in abduction, flexion, elbow extension                      Patient will increase FOTO score to 67 points overall to demonstrate improvement in functional status.                     Status at IE: 28                   Current: Same as IE              *FOTO score is an established functional score where 100 = no disability*    PLAN  []  Upgrade activities as tolerated     [x]  Continue plan of care  []  Update interventions per flow sheet       []  Discharge due to:_  []  Other:_      Tigre Pedro PT, DPT 12/8/2020  10:26 AM    Future Appointments   Date Time Provider Kolby Ruiz   12/8/2020 11:00 AM Maria Luz Wang PT, DPT [facility]   12/11/2020  1:00 PM Mercedes Macksburg, PT, DPT [facility]   12/11/2020  1:45 PM Mercedes Macksburg, PT, DPT [facility]   12/14/2020 10:15 AM Mercedes Macksburg, PT, DPT [facility]   12/14/2020 11:00 AM Mercedes Macksburg, PT, DPT [facility]   12/16/2020  8:00 AM Mercedes Macksburg, PT, DPT [facility]   12/16/2020  8:45 AM Mercedes Macksburg, PT, DPT [facility]

## 2020-12-09 ENCOUNTER — APPOINTMENT (OUTPATIENT)
Dept: PHYSICAL THERAPY | Age: 50
End: 2020-12-09
Payer: COMMERCIAL

## 2020-12-11 ENCOUNTER — APPOINTMENT (OUTPATIENT)
Dept: PHYSICAL THERAPY | Age: 50
End: 2020-12-11
Payer: COMMERCIAL

## 2020-12-11 ENCOUNTER — HOSPITAL ENCOUNTER (OUTPATIENT)
Dept: PHYSICAL THERAPY | Age: 50
Discharge: HOME | End: 2020-12-11
Payer: COMMERCIAL

## 2020-12-11 PROCEDURE — 97110 THERAPEUTIC EXERCISES: CPT | Performed by: PHYSICAL THERAPIST

## 2020-12-11 NOTE — PROGRESS NOTES
PT DAILY TREATMENT NOTE    Patient Name: Huma Neely  Date:2020  : 1970  [x]  Patient  Verified  Payor: Shaka Tsang / Plan: [place] [employer] [place] / Product Type: PPO /    In time:3:15  Out time:3:46  Total Treatment Time (min): 41  Total Timed Codes (min): 41  1:1 Treatment Time ([facility] only): 41   Visit #: 12 of 12    Treatment Area: Left shoulder pain [M25.512]    SUBJECTIVE  Pain Level (0-10 scale): 0  Any medication changes, allergies to medications, adverse drug reactions, diagnosis change, or new procedure performed?: [x] No    [] Yes (see summary sheet for update)  Subjective functional status/changes:   [] No changes reported  Feels good. Would like to wrap therapy up soon. OBJECTIVE    41 min Therapeutic Exercise:  [x] See flow sheet :   Rationale: increase ROM, increase strength and decrease pain to improve the patients ability to complete ADLs    With   [] TE   [] TA   [] neuro   [] other: Patient Education: [x] Review HEP    [] Progressed/Changed HEP based on:   [] positioning   [] body mechanics   [] transfers   [] heat/ice application    [] other:      Other Objective/Functional Measures: NA     Pain Level (0-10 scale) post treatment: 0    ASSESSMENT/Changes in Function: Patient responds well to treatment session. Patient is progressing well. Minimal difficulty with exercises. Will discharge next week.  No adverse effects were noted from today's treatment session       Patient will continue to benefit from skilled PT services to modify and progress therapeutic interventions, address functional mobility deficits, address ROM deficits, address strength deficits, analyze and address soft tissue restrictions, analyze and cue movement patterns, analyze and modify body mechanics/ergonomics, assess and modify postural abnormalities    []  See Plan of Care  []  See progress note/recertification  []  See Discharge Summary         Progress towards goals / Updated goals:  Short Term Goals: To be accomplished in 2 weeks:                   Patient will report compliance with HEP at least 1x/day to aid in rehabilitation program.                   Status at IE: NA                   Current: Reports compliance, metk 11/6/2020                      Patient will display full pain free AAROM into flexin to aid in completion of ADLs.                  Status at IE: AROM 0-45 flex                   Current: 0-140 flexion, Met 11/16/2020        Long Term Goals: To be accomplished in 6 weeks:                   Patient will report compliance with HEP a least 3-4x/week to aid in rehabilitation/strengthening program.                   Status at IE: NA                   Current: Same as IE                      Patient will report no pain greater than 1-2/10 with overhead activities to aid in completion of ADLs.                  Status at IE: 3-5                   Current: 3/10, progressing 11/25/2020                      Patient will increase B UE strength to 5/5 throughout all planes to aid in completion of ADLs.                  Status at IE: 4-/5                    Current:5/5 grossly, except 4/5 in abduction, flexion, elbow extension 12/8/2020                      Patient will increase FOTO score to 67 points overall to demonstrate improvement in functional status.                     Status at IE: 28                   Current: Same as IE              *FOTO score is an established functional score where 100 = no disability*    PLAN  []  Upgrade activities as tolerated     [x]  Continue plan of care  []  Update interventions per flow sheet       []  Discharge due to:_  []  Other:_      Brandon Pinto PT, DPDANIEL 12/11/2020  3:34 PM    Future Appointments   Date Time Provider Kolby Ruiz   12/14/2020 10:15 AM Junior Radha PT, DPT [facility]   12/16/2020  8:00 AM Junior Radha PT, LAURA G[facility]

## 2020-12-14 ENCOUNTER — HOSPITAL ENCOUNTER (OUTPATIENT)
Dept: PHYSICAL THERAPY | Age: 50
Discharge: HOME | End: 2020-12-14
Payer: COMMERCIAL

## 2020-12-14 ENCOUNTER — APPOINTMENT (OUTPATIENT)
Dept: PHYSICAL THERAPY | Age: 50
End: 2020-12-14
Payer: COMMERCIAL

## 2020-12-14 PROCEDURE — 97110 THERAPEUTIC EXERCISES: CPT | Performed by: PHYSICAL THERAPIST

## 2020-12-14 NOTE — PROGRESS NOTES
PT DAILY TREATMENT NOTE    Patient Name: Brent Ascencio  Date:2020  : 1970  [x]  Patient  Verified  Payor: Mesha Davies / [place]: [place] [employer] [place] / Product Type: PPO /    In time:10:18  Out time:11:01  Total Treatment Time (min): 43  Total Timed Codes (min): 43   Visit #: 13 of 14    Treatment Area: Left shoulder pain [M25.512]    SUBJECTIVE  Pain Level (0-10 scale): 0  Any medication changes, allergies to medications, adverse drug reactions, diagnosis change, or new procedure performed?: [x] No    [] Yes (see summary sheet for update)  Subjective functional status/changes:   [] No changes reported  Feels good. Still feel the weakness in the left shoulder    OBJECTIVE    43 min Therapeutic Exercise:  [x] See flow sheet :   Rationale: increase ROM, increase strength and decrease pain to improve the patients ability to complete ADLs    With   [] TE   [] TA   [] neuro   [] other: Patient Education: [x] Review HEP    [] Progressed/Changed HEP based on:   [] positioning   [] body mechanics   [] transfers   [] heat/ice application    [] other:      Other Objective/Functional Measures: NA     Pain Level (0-10 scale) post treatment: 0    ASSESSMENT/Changes in Function: Patient responds well to treatment session. Patient is progressing well. Minimal difficulty with exercises as prescribed. Will progress as tolerated. Discharge next visit.  No adverse effects were noted from today's treatment session       Patient will continue to benefit from skilled PT services to modify and progress therapeutic interventions, address functional mobility deficits, address ROM deficits, address strength deficits, analyze and address soft tissue restrictions, analyze and cue movement patterns, analyze and modify body mechanics/ergonomics, assess and modify postural abnormalities  []  See Plan of Care  []  See progress note/recertification  []  See Discharge Summary         Progress towards goals / Updated goals:  Short Term Goals: To be accomplished in 2 weeks:                   Patient will report compliance with HEP at least 1x/day to aid in rehabilitation program.                   Status at IE: NA                   Current: Reports compliance, metk 11/6/2020                      Patient will display full pain free AAROM into flexin to aid in completion of ADLs.                  Status at IE: AROM 0-45 flex                   Current: 0-140 flexion, Met 11/16/2020        Long Term Goals: To be accomplished in 6 weeks:                   Patient will report compliance with HEP a least 3-4x/week to aid in rehabilitation/strengthening program.                   Status at IE: NA                   Current: Same as IE                      Patient will report no pain greater than 1-2/10 with overhead activities to aid in completion of ADLs.                  Status at IE: 3-5                   Current: 3/10, progressing 11/25/2020                      Patient will increase B UE strength to 5/5 throughout all planes to aid in completion of ADLs.                  Status at IE: 4-/5                    Current:5/5 grossly, except 4/5 in abduction, flexion, elbow extension 12/8/2020                      Patient will increase FOTO score to 67 points overall to demonstrate improvement in functional status.                     Status at IE: 28                   Current: Same as IE              *FOTO score is an established functional score where 100 = no disability*    PLAN  []  Upgrade activities as tolerated     [x]  Continue plan of care  []  Update interventions per flow sheet       []  Discharge due to:_  []  Other:_      Sarah Snell, PT, DPT 12/14/2020  10:35 AM    Future Appointments   Date Time Provider Kolby Ruiz   12/16/2020  8:00 AM Meliza Parada, PT, DPT [facility]

## 2020-12-16 ENCOUNTER — APPOINTMENT (OUTPATIENT)
Dept: PHYSICAL THERAPY | Age: 50
End: 2020-12-16
Payer: COMMERCIAL

## 2020-12-16 ENCOUNTER — HOSPITAL ENCOUNTER (OUTPATIENT)
Dept: PHYSICAL THERAPY | Age: 50
Discharge: HOME | End: 2020-12-16
Payer: COMMERCIAL

## 2020-12-16 PROCEDURE — 97110 THERAPEUTIC EXERCISES: CPT | Performed by: PHYSICAL THERAPIST

## 2020-12-16 NOTE — PROGRESS NOTES
[facility] at [address] [place] [address] Drive: [phone]   Fax: [phone]  Discharge Summary  Patient Name: Arta Runner : 1970   Medical   Diagnosis: Left shoulder pain [M25.512] Treatment Diagnosis: Left shoulder pain   Onset Date: 10/26/2020     Referral Source: Rosetta Webber MD [facility]): 2020   Prior Hospitalization: See medical history Provider #: [id number]   Prior Level of Function: Active, weight lifting   Comorbidities: OA   Medications: Verified on Patient Summary List      ===========================================================================================  Assessment / Summary of Care:  Arta Runner is a 44 y.o.  yo female who is now 7 wks s/p left shoulder SAD on 10/26/2020. She has made good progress to date. Meeting most goals. Is capable of being discharged to independent [facility] at this time. . No adverse effects were noted from today's treatment session    ===========================================================================================    Plan: Discharge to independent [facility]. Goals:   Short Term Goals: To be accomplished in 2 weeks:                   MGEKRMJ will report compliance with HEP at least 1x/day to aid in rehabilitation program.                   Status at IE: NA                   Current: Reports compliance, metk 2020                      Patient will display full pain free AAROM into flexin to aid in completion of ADLs.                    Status at IE: AROM 0-45 flex                   Current: 0-140 flexion, Met 2020        Long Term Goals: To be accomplished in 6 weeks:                   [id number] will report compliance with HEP a least 3-4x/week to aid in rehabilitation/strengthening program.                   Status at IE: NA                   Current: Independent with HEP, reports compliance, Met 2020                      Patient will report no pain greater than 1-2/10 with overhead activities to aid in completion of ADLs.                  Status at IE: 3-5                   Current: 0/10, Met 12/16/2020                      Patient will increase B UE strength to 5/5 throughout all planes to aid in completion of ADLs.                  Status at IE: 4-/5                    Current:5/5 grossly, except 4/5 in abduction, flexion, elbow extension 12/8/2020                      Patient will increase FOTO score to 67 points overall to demonstrate improvement in functional status.                  Status at IE: 28                   Current: 70, Met 12/16/2020              *FOTO score is an established functional score where 100 = no disability*    ===========================================================================================  Subjective: Feels good. Ready to be discharged.        Objective: MMT 4+/5 in abd and ER, otherwise 5/5 grossly; AROM 0-165 flexion also limited with horizontal adduction           Therapist Signature: Luther Chang PT, DPT, OCS Date: 12/16/2020     Time: 9:01 AM

## 2020-12-16 NOTE — PROGRESS NOTES
PT DAILY TREATMENT NOTE    Patient Name: Sara Easton  Date:2020  : 1970  [x]  Patient  Verified  Payor: Queta Ortiz / Plan: [place] [employer] [place] / Product Type: PPO /    In time:8:05  Out time:8:54  Total Treatment Time (min): 49  Total Timed Codes (min): 49   Visit #: 14 of 14    Treatment Area: Left shoulder pain [M25.512]    SUBJECTIVE  Pain Level (0-10 scale): 0  Any medication changes, allergies to medications, adverse drug reactions, diagnosis change, or new procedure performed?: [x] No    [] Yes (see summary sheet for update)  Subjective functional status/changes:   [] No changes reported  Feels good. Ready to be discharged. OBJECTIVE    49 min Therapeutic Exercise:  [x] See flow sheet :   Rationale: increase ROM, increase strength and decrease pain to improve the patients ability to complete ADLs    With   [] TE   [] TA   [] neuro   [] other: Patient Education: [x] Review HEP    [] Progressed/Changed HEP based on:   [] positioning   [] body mechanics   [] transfers   [] heat/ice application    [] other:      Other Objective/Functional Measures: MMT 4+/5 in abd and ER, otherwise 5/5 grossly     Pain Level (0-10 scale) post treatment: 0    ASSESSMENT/Changes in Function: Patient responds well to treatment session. Patient is now 7 wks s/p left shoulder SAD on 10/26/2020. She has made good progress to date. Meeting most goals. Is capable of being discharged to independent [facility] at this time.  . No adverse effects were noted from today's treatment session    Patient will continue to benefit from skilled PT services to modify and progress therapeutic interventions, address functional mobility deficits, address ROM deficits, address strength deficits, analyze and address soft tissue restrictions, analyze and cue movement patterns, analyze and modify body mechanics/ergonomics, assess and modify postural abnormalities    []  See Plan of Care  []  See progress note/recertification  []  See Discharge Summary         Progress towards goals / Updated goals:  Short Term Goals: To be accomplished in 2 weeks:                   [id number] will report compliance with HEP at least 1x/day to aid in rehabilitation program.                   Status at IE: NA                   Current: Reports compliance, metk 11/6/2020                      Patient will display full pain free AAROM into flexin to aid in completion of ADLs.                  Status at IE: AROM 0-45 flex                   Current: 0-140 flexion, Met 11/16/2020        Long Term Goals: To be accomplished in 6 weeks:                   Patient will report compliance with HEP a least 3-4x/week to aid in rehabilitation/strengthening program.                   Status at IE: NA                   Current: Same as IE                      Patient will report no pain greater than 1-2/10 with overhead activities to aid in completion of ADLs.                  Status at IE: 3-5                   Current: 0/10, Met 12/16/2020                      Patient will increase B UE strength to 5/5 throughout all planes to aid in completion of ADLs.                  Status at IE: 4-/5                    Current:5/5 grossly, except 4/5 in abduction, flexion, elbow extension 12/8/2020                      Patient will increase FOTO score to 67 points overall to demonstrate improvement in functional status.                  Status at IE: 28                   Current: 70, Met 12/16/2020              *FOTO score is an established functional score where 100 = no disability*    PLAN  []  Upgrade activities as tolerated     []  Continue plan of care  []  Update interventions per flow sheet       [x]  Discharge due to:_  []  Other:_      Kailash Mabry PT, DPT 12/16/2020  8:13 AM    No future appointments.

## 2022-08-17 ENCOUNTER — HOSPITAL ENCOUNTER (OUTPATIENT)
Dept: LAB | Age: 52
Discharge: HOME | End: 2022-08-17

## 2022-08-17 ENCOUNTER — TRANSCRIBE ORDER (OUTPATIENT)
Dept: REGISTRATION | Age: 52
End: 2022-08-17

## 2022-08-17 ENCOUNTER — HOSPITAL ENCOUNTER (OUTPATIENT)
Dept: NON INVASIVE DIAGNOSTICS | Age: 52
Discharge: HOME | End: 2022-08-17

## 2022-08-17 ENCOUNTER — HOSPITAL ENCOUNTER (OUTPATIENT)
Dept: PHYSICAL THERAPY | Age: 52
Discharge: HOME | End: 2022-08-17
Payer: COMMERCIAL

## 2022-08-17 DIAGNOSIS — M89.8X7 PAIN IN METATARSUS: ICD-10-CM

## 2022-08-17 DIAGNOSIS — M19.072 ARTHRITIS OF LEFT ANKLE: ICD-10-CM

## 2022-08-17 DIAGNOSIS — M89.8X7 PAIN IN METATARSUS: Primary | ICD-10-CM

## 2022-08-17 LAB
ATRIAL RATE: 67 BPM
CALCULATED P AXIS, ECG09: 33 DEGREES
CALCULATED R AXIS, ECG10: 80 DEGREES
CALCULATED T AXIS, ECG11: 39 DEGREES
DIAGNOSIS, 93000: NORMAL
P-R INTERVAL, ECG05: 128 MS
Q-T INTERVAL, ECG07: 372 MS
QRS DURATION, ECG06: 78 MS
QTC CALCULATION (BEZET), ECG08: 393 MS
SENTARA SPECIMEN COL,SENBCF: NORMAL
VENTRICULAR RATE, ECG03: 67 BPM

## 2022-08-17 PROCEDURE — 97110 THERAPEUTIC EXERCISES: CPT

## 2022-08-17 PROCEDURE — 93005 ELECTROCARDIOGRAM TRACING: CPT

## 2022-08-17 PROCEDURE — 99001 SPECIMEN HANDLING PT-LAB: CPT

## 2022-08-17 PROCEDURE — 97161 PT EVAL LOW COMPLEX 20 MIN: CPT

## 2022-08-17 NOTE — PROGRESS NOTES
PT DAILY TREATMENT NOTE/CERVICAL [id number]    Patient Name: Saunders Denver  Date:2022  : 1970  [x]  Patient  Verified  Payor: Elvin Carvalho / Plan: [place] [employer] / Product Type: PPO /    In time:1104  Out time:1148  Total Treatment Time (min): 25  Visit #: 1 of 16    Treatment Area: Cervicalgia [M54.2]    SUBJECTIVE  Pain Level (0-10 scale): 2 (1-4)  []constant []intermittent []improving [x]worsening []no change since onset    Any medication changes, allergies to medications, adverse drug reactions, diagnosis change, or new procedure performed?: [x] No    [] Yes (see summary sheet for update)  Subjective functional status/changes:     Patient has CC of worsening left cervical pain with steroid injections becoming less helpful and surgeon recommending fusion. Patient now seeking PT intervention to try to avoid surgery. Patient reports left cervical pain has been worsening over past two years. Patient describes pain as left cervical constant ache with intermittent sharp pain with movement. .Also reports intermittent occipital headaches. Pain is worse in PM. Reports intermittent numbness/tingling bilateral hands at night. Denies popping/clicking. Aggravating factors:cervical rotation and extension. Alleviating factors: heat, massage. Denies red flags: SOB, chest pain, dizziness/lightheadedness, blurred/double vision, HA, chills/fevers, night sweats, change in bowel/bladder control, abdominal pain, difficulty swallowing, slurred speech, unexplained weight gain/loss, nausea, vomiting. PMHx: OA,. Surgical Hx: left shoulder arthroscopy . Social Hx: NA home, alcohol, tobacco, work status: \"retired\" . PLOF: caretaker for both infirm parents. CLOF: pain wakes patient at night, moderate difficulty with washing hair and driving. Diagnostic Imaging: mild to moderate facet hypertrophy greatest at left C3-4 Recent falls Hx:none.       OBJECTIVE/EXAMINATION    19 min [x]Eval                  []Re-Eval       25 min Therapeutic Exercise:  [x] See flow sheet :   Rationale: increase ROM and increase strength to improve the patients ability to perform routine ADLs. With   [] TE   [] TA   [] neuro   [] other: Patient Education: [x] Review HEP    [] Progressed/Changed HEP based on:   [] positioning   [] body mechanics   [] transfers   [] heat/ice application    [] other:          Physical Therapy Evaluation Cervical Spine     OBJECTIVE  Posture: [] WNL  Head Position: moderate forward head  Shoulder/Scapular Position:protracted scapulae  C-Kyphosis:  [] increased   [] decreased   C-Lordosis:   [] increased   [x] decreased  T-Kyphosis:  [] increased   [] decreased  T-Lordosis:   [] increased   [] decreased     Cervical Retraction: [] WNL    [x] Abnormal: increased pain    Shoulder/Scapular Screen: [] WNL    [] Abnormal:    Active Movements: [] N/A   [] Too acute   [] Other:  ROM  AROM Strength Comments:pain, area   Forward flexion 22 4    Extension 32 4 Increased pain   SB right 27 4    SB left  22 4 Increased pain   Rotation right 50 4    Rotation left 37 4 Increased pain     Thoracic Spine: [] N/A    [] WNL   [] Other:    Palpation:  [] Min  [] Mod  [x] Severe    Location: Left C3-4  [] Min  [] Mod  [] Severe    Location:    UE Myotome:    WNL                                                                             Upper Limb Tension Tests: [] N/A       Ulnar: [] R    [] L    [] +    [x] -       Median: [] R    [] L    [] +    [x] -       Radial: [] R    [] L    [] +    [x] -    Special Tests:  Cervical:        Vertebral Artery:  [] R    [] L    [] +    [x] -       Alar Ligament: [] R    [] L    [] +    [x] -       Transverse Lig: [] R    [] L    [] +    [x] -       Spurling's:  [] R    [] L    [] +    [x] -       Distraction:  [] R    [] L    [x] +    [] -   Decreased Pain       Compression: [] R    [] L    [x] +    [] -   Increased Pain    Thoracic Outlet Tests: [] N/A       Adson's:  [] R    [] L    [] +    [x] - Hyperabduction: [] R    [] L    [] +    [] -       Antelmo's:  [] R    [] L    [] +    [] -       Manuel:  [] R    [] L    [] +    [x] -    Diaphragmatic Breathing: [x] Normal    [] Abnormal      Other tests/comments:       Pain Level (0-10 scale) post treatment: 2    ASSESSMENT/Changes in Function: Patient presents with c/o persistent and gradually progressing left cervical pain now unresponsive to steroid injections. Physical findings consistent with MRI results of left C3-4 facet hypertrophy with foraminal encroachment. Patient exhibits decreased cervical AROM and strength with mild to moderate pain and moderate difficulty with ADLs of driving, washing hair and above shoulder level tasks as well as moderate difficulty sleeping due to cervical pain. Patient will continue to benefit from skilled PT services to modify and progress therapeutic interventions, address functional mobility deficits, address ROM deficits, address strength deficits, analyze and address soft tissue restrictions, analyze and cue movement patterns, analyze and modify body mechanics/ergonomics and assess and modify postural abnormalities to attain remaining goals.      [x]  See Plan of Care  []  See progress note/recertification  []  See Discharge Summary         Progress towards goals / Updated goals:  See POC    PLAN  []  Upgrade activities as tolerated     [x]  Continue plan of care  []  Update interventions per flow sheet       []  Discharge due to:_  []  Other:_      Yao Ye, PT 8/17/2022  11:05 AM

## 2022-08-17 NOTE — PROGRESS NOTES
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   ===========================================================================================  Assessment / key information:  Coco Menon is a 45 y.o.  female who presents with  c/o persistent and gradually progressing left cervical pain now unresponsive to steroid injections. Physical findings consistent with MRI results of left C3-4 facet hypertrophy with foraminal encroachment. Patient exhibits decreased cervical AROM and strength with mild to moderate pain and moderate difficulty with ADLs of driving, washing hair and above shoulder level tasks as well as moderate difficulty sleeping due to cervical pain. Patient will continue to benefit from skilled PT services to modify and progress therapeutic interventions, address functional mobility deficits, address ROM deficits, address strength deficits, analyze and address soft tissue restrictions, analyze and cue movement patterns, analyze and modify body mechanics/ergonomics and assess and modify postural abnormalities to attain remaining goals.       Pt instructed in HEP and will f/u in clinic for PT.  ===========================================================================================  Eval Complexity: History MEDIUM Complexity : 1-2 comorbidities / personal factors will impact the outcome/ POC ;  Examination  LOW Complexity : 1-2 Standardized tests and measures addressing body structure, function, activity limitation and / or participation in recreation ; Presentation LOW Complexity : Stable, uncomplicated ;  Decision Making MEDIUM Complexity : FOTO score of 26-74; : FOTO score = an established functional score where 100 = no disability  Overall Complexity LOW   Problem List: pain affecting function, decrease ROM, decrease strength, decrease ADL/ functional abilitiies, decrease activity tolerance, and decrease flexibility/ joint mobility   Treatment Plan may include any combination of the following: Therapeutic exercise, Therapeutic activities, Neuromuscular re-education, Physical agent/modality, Manual therapy, Patient education, and Functional mobility training  Patient / Family readiness to learn indicated by: asking questions, trying to perform skills and interest  Persons(s) to be included in education: patient (P)  Barriers to Learning/Limitations: no  Measures Taken: NA  Patient Goal (s): \"More mobility, less pain\"   Patient self reported health status: fair  Rehabilitation Potential: good  Goals:  Short Term Goals: To be accomplished in 4 weeks:   Patient will report compliance with HEP 1x/day to aid in rehabilitation program.   Status at Ie: Patient instructed in and provided written copy of initial Home Exercise Program.   Current:Same as IE      Patient will increase cervical ROM to WNL in all planes to aid in completion of ADLs. Status at IE:flex 22, ext 32, SB right 27, left 22, rotation right 50, left 37   Current: Same as IE    Long Term Goals: To be accomplished in 8 weeks:   Patient will increase cervical muscle strength to 5/5 MMT throughout to aid in completion of ADLs.    Status at IE:4/5 throughout   Current: Same as IE     Patient will report pain no greater than 1-2/10 throughout entire day to aid in completion of ADLs. Status at IE:2-4/10   Current: Same as IE     Patent will be able to sleep undisturbed and perform self care ADLs without neck pain. Status at [address] [facility] wakes patient frequently at night, moderate difficulty washing hair and driving. Current: Same as IE     Patient will improve FOTO (an established functional score where 100 = no disability) score to 62 points to demonstrate improvement in functional status. Status at IE:54   Current: Same as IE      Frequency / Duration:   Patient to be seen 2  times per week for 8  weeks:  Patient / Caregiver education and instruction: self care and exercises      . Therapist Signature: Thomas Jo DPT Date: 6/79/0196   Certification Period: NA Time: 1:15 PM   ===========================================================================================  I certify that the above Physical Therapy Services are being furnished while the patient is under my care. I agree with the treatment plan and certify that this therapy is necessary. Physician Signature:        Date:       Time:        Oralia Patricia MD    Please sign and return to [facility] or you may fax the signed copy to [phone]. Thank you.

## 2022-08-18 ENCOUNTER — HOSPITAL ENCOUNTER (OUTPATIENT)
Dept: PHYSICAL THERAPY | Age: 52
Discharge: HOME | End: 2022-08-18
Payer: COMMERCIAL

## 2022-08-18 PROCEDURE — 97112 NEUROMUSCULAR REEDUCATION: CPT

## 2022-08-18 PROCEDURE — 97140 MANUAL THERAPY 1/> REGIONS: CPT

## 2022-08-18 PROCEDURE — 97110 THERAPEUTIC EXERCISES: CPT

## 2022-08-18 PROCEDURE — 97530 THERAPEUTIC ACTIVITIES: CPT

## 2022-08-25 ENCOUNTER — HOSPITAL ENCOUNTER (OUTPATIENT)
Dept: PHYSICAL THERAPY | Age: 52
Discharge: HOME | End: 2022-08-25
Payer: COMMERCIAL

## 2022-08-25 PROCEDURE — 97112 NEUROMUSCULAR REEDUCATION: CPT

## 2022-08-25 PROCEDURE — 97530 THERAPEUTIC ACTIVITIES: CPT

## 2022-08-25 PROCEDURE — 97110 THERAPEUTIC EXERCISES: CPT

## 2022-08-25 PROCEDURE — 97140 MANUAL THERAPY 1/> REGIONS: CPT

## 2022-08-25 NOTE — PROGRESS NOTES
PT DAILY TREATMENT NOTE    Patient Name: Ben Acuna  Date:2022  : 1970  [x]  Patient  Verified  Payor: Yovana Buckley / Plan: [place] [employer] [place] / Product Type: PPO /    In time:530  Out time:625  Total Treatment Time (min): 55  Total Timed Codes (min): 55  Visit #: 3 of 16    Treatment Dx: Cervicalgia [M54.2]    SUBJECTIVE  Pain Level (0-10 scale): 2  Any medication changes, allergies to medications, adverse drug reactions, diagnosis change, or new procedure performed?: [x] No    [] Yes (see summary sheet for update)  Subjective functional status/changes:   [] No changes reported  \"I feel a sharp pain and tightness with my shoulder. \"    OBJECTIVE  20 min Therapeutic Exercise:  [x] See flow sheet :   Rationale: increase ROM and increase strength to improve the patients ability to return PLOF    10 min Therapeutic Activity:  [x]  See flow sheet :   Rationale: increase ROM and increase strength  to improve the patients ability to perform ADL's without pain and symptom levels     10 min Neuromuscular Re-education:  [x]  See flow sheet :   Rationale: increase ROM and increase strength  to improve the patients ability to perform ADL's without pain and symptom levels     15 min Manual Therapy:  Supine/ suboccipital release, STM bilateral upper trapezius and pectoralis   Rationale: decrease pain, increase ROM, increase tissue extensibility, decrease trigger points, and increase postural awareness to improve the patients ability to perform ADL's without pain and symptom levels  The manual therapy interventions were performed at a separate and distinct time from the therapeutic activities interventions.       With   [] TE   [] TA   [] neuro   [] other: Patient Education: [x] Review HEP    [] Progressed/Changed HEP based on:   [] positioning   [] body mechanics   [] transfers   [x] heat/ice application    [] other:      Other Objective/Functional Measures: NA     Pain Level (0-10 scale) post treatment: 1    ASSESSMENT/Changes in Function:   Patient tolerated treatment session well today. Due to muscle tightness, added upper trapezius and levator scapulae stretch to increase flexibility. Patient education to on practicing good posture to reduced neck pain. However, patient has improved with ADL's with less pain. Patient continues to make steady progress toward goals and would benefit from continued skilled PT intervention to address remaining deficits outlined in goals below. Patient will continue to benefit from skilled PT services to modify and progress therapeutic interventions, address functional mobility deficits, address ROM deficits, address strength deficits, analyze and address soft tissue restrictions, analyze and cue movement patterns, analyze and modify body mechanics/ergonomics, and assess and modify postural abnormalities to attain remaining goals. [x]  See Plan of Care  []  See progress note/recertification  []  See Discharge Summary         Progress towards goals / Updated goals:  Short Term Goals: To be accomplished in 4 weeks:                   Patient will report compliance with HEP 1x/day to aid in rehabilitation program.                   Status at Ie: Patient instructed in and provided written copy of initial Home Exercise Program.                   Current: Patient reports good initial compliance with basic exercises provided for initial HEP.     8/18/2022, in progress                      Patient will increase cervical ROM to WNL in all planes to aid in completion of ADLs. Status at IE:flex 22, ext 32, SB right 27, left 22, rotation right 50, left 37                   Current: Same as IE     Long Term Goals: To be accomplished in 8 weeks:                   Patient will increase cervical muscle strength to 5/5 MMT throughout to aid in completion of ADLs.                    Status at IE:4/5 throughout                   Current: Same as IE                      Patient will report pain no greater than 1-2/10 throughout entire day to aid in completion of ADLs. Status at IE:2-4/10                   Current: Same as IE                      Patent will be able to sleep undisturbed and perform self care ADLs without neck pain. Status at [address] [facility] wakes patient frequently at night, moderate difficulty washing hair and driving. Current 8/25/22: Patient is able to perform ADL's with less pain. IN PROGRESS                      Patient will improve FOTO (an established functional score where 100 = no disability) score to 62 points to demonstrate improvement in functional status.                    Status at IE:54                   Current: Same as IE       PLAN  []  Upgrade activities as tolerated     [x]  Continue plan of care  []  Update interventions per flow sheet       []  Discharge due to:_  []  Other:_      Momo Lawrence PTA 8/25/2022  3:01 PM    Future Appointments   Date Time Provider Kolby Ruiz   8/25/2022  5:30 PM DEMETRICE Gomez [facility]   8/26/2022  3:15 PM DEMETRICE GomezHPTVIANEY [facility]   8/30/2022  4:45 PM Renetta Watson [place] [facility]   9/7/2022  2:00 PM GILSON ParnellHPTVIANEY [facility]   9/9/2022  2:45 PM Renetta Watson PT [facility]   9/12/2022  9:30 AM Saintclair Cower, PT [facility]   9/14/2022  9:30 AM Kervin Dixon PT [facility] Detail Level: Zone

## 2022-08-26 ENCOUNTER — HOSPITAL ENCOUNTER (OUTPATIENT)
Dept: PHYSICAL THERAPY | Age: 52
Discharge: HOME | End: 2022-08-26
Payer: COMMERCIAL

## 2022-08-26 PROCEDURE — 97110 THERAPEUTIC EXERCISES: CPT

## 2022-08-26 PROCEDURE — 97530 THERAPEUTIC ACTIVITIES: CPT

## 2022-08-26 PROCEDURE — 97140 MANUAL THERAPY 1/> REGIONS: CPT

## 2022-08-26 NOTE — PROGRESS NOTES
PT DAILY TREATMENT NOTE    Patient Name: Hanna Mayes  Date:2022  : 1970  [x]  Patient  Verified  Payor: Sotero Demarco / Plan: [place] [employer] [place] / Product Type: PPO /    In time:315  Out time:403  Total Treatment Time (min): 48  Total Timed Codes (min): 48  Visit #: 4 of 16    Treatment Dx: Cervicalgia [M54.2]    SUBJECTIVE  Pain Level (0-10 scale): 1  Any medication changes, allergies to medications, adverse drug reactions, diagnosis change, or new procedure performed?: [x] No    [] Yes (see summary sheet for update)  Subjective functional status/changes:   [] No changes reported  Patient reports that last session have helped reduced neck pain significantly today. She was able to do yard work this morning without minimal pain and improved with head rotation. OBJECTIVE  18 min Therapeutic Exercise:  [x] See flow sheet :   Rationale: increase ROM and increase strength to improve the patients ability to perform ADL's without pain and symptom levels    15 min Therapeutic Activity:  [x]  See flow sheet :   Rationale: increase ROM, increase strength, and decreased pain   to improve the patients ability to ADL's without pain and symptom levels         15 min Manual Therapy:  Supine/ suboccipital release, STM bilateral upper trapezius and pectoralis   Rationale: decrease pain, increase ROM, increase tissue extensibility, decrease trigger points, and increase postural awareness to improve the patients ability to perform ADL's without pain and symptom levels  The manual therapy interventions were performed at a separate and distinct time from the therapeutic activities interventions.                      With   [] TE   [] TA   [] neuro   [] other: Patient Education: [x] Review HEP    [] Progressed/Changed HEP based on:   [] positioning   [x] body mechanics   [] transfers   [x] heat/ice application    [] other:      Other Objective/Functional Measures: NA     Pain Level (0-10 scale) post treatment: 0    ASSESSMENT/Changes in Function:   Patient tolerated treatment session well today. Patient improved with cervical rotation and extension without pain. Cues with rows/extension to improve with upright posture and engaging rhomboids. Patient education on practicing good posture and body mechanics. Patient continues to make steady progress toward goals and would benefit from continued skilled PT intervention to address remaining deficits outlined in goals below. Patient will continue to benefit from skilled PT services to modify and progress therapeutic interventions, address functional mobility deficits, address ROM deficits, address strength deficits, analyze and address soft tissue restrictions, analyze and cue movement patterns, analyze and modify body mechanics/ergonomics, and assess and modify postural abnormalities to attain remaining goals. [x]  See Plan of Care  []  See progress note/recertification  []  See Discharge Summary         Progress towards goals / Updated goals:  Short Term Goals: To be accomplished in 4 weeks:                   Patient will report compliance with HEP 1x/day to aid in rehabilitation program.                   Status at Ie: Patient instructed in and provided written copy of initial Home Exercise Program.                   Current: Patient reports good initial compliance with basic exercises provided for initial HEP.     8/18/2022, in progress                      Patient will increase cervical ROM to WNL in all planes to aid in completion of ADLs. Status at IE:flex 22, ext 32, SB right 27, left 22, rotation right 50, left 37                   Current: Same as IE     Long Term Goals: To be accomplished in 8 weeks:                   Patient will increase cervical muscle strength to 5/5 MMT throughout to aid in completion of ADLs. Status at IE:4/5 throughout                   Current 8/26/22: cervical 4/5 MMT.  IN PROGRESS Patient will report pain no greater than 1-2/10 throughout entire day to aid in completion of ADLs. Status at IE:2-4/10                   Current: Same as IE                      Patent will be able to sleep undisturbed and perform self care ADLs without neck pain. Status at [facility] wakes patient frequently at night, moderate difficulty washing hair and driving. Current 8/25/22: Patient is able to perform ADL's with less pain. IN PROGRESS                      Patient will improve FOTO (an established functional score where 100 = no disability) score to 62 points to demonstrate improvement in functional status.                    Status at IE:54                   Current: Same as IE    PLAN  []  Upgrade activities as tolerated     [x]  Continue plan of care  []  Update interventions per flow sheet       []  Discharge due to:_  []  Other:_      Chris Ziegler PTA 8/26/2022  4:17 PM    Future Appointments   Date Time Provider Kolby Ruiz   8/30/2022  4:45 PM Amaury Lei [facility]   9/7/2022  2:00 PM Amaury Lei [facility]   9/9/2022  2:45 PM Barry Watson, GILSON GALARZA [facility]   9/12/2022  9:30 AM Vandanabing Jordan [facility]   9/14/2022  9:30 AM GILSON Lei [facility]

## 2022-08-30 ENCOUNTER — HOSPITAL ENCOUNTER (OUTPATIENT)
Dept: PHYSICAL THERAPY | Age: 52
Discharge: HOME | End: 2022-08-30
Payer: COMMERCIAL

## 2022-08-30 PROCEDURE — 97140 MANUAL THERAPY 1/> REGIONS: CPT

## 2022-08-30 PROCEDURE — 97530 THERAPEUTIC ACTIVITIES: CPT

## 2022-08-30 PROCEDURE — 97110 THERAPEUTIC EXERCISES: CPT

## 2022-08-30 PROCEDURE — 97112 NEUROMUSCULAR REEDUCATION: CPT

## 2022-08-30 NOTE — PROGRESS NOTES
PT DAILY TREATMENT NOTE    Patient Name: Ken Rodney  Date:2022  : 1970  [x]  Patient  Verified  Payor: Haresh Nobles / [place] [employer] / Product Type: PPO /    In time: 446  Out time: 544  Total Treatment Time (min): 58  Total Timed Codes (min): 58  1:1 Treatment Time ( only): 58   Visit #: 5 of 16    Treatment Dx: Cervicalgia [M54.2]    SUBJECTIVE  Pain Level (0-10 scale): 2  Any medication changes, allergies to medications, adverse drug reactions, diagnosis change, or new procedure performed?: [x] No    [] Yes (see summary sheet for update)  Subjective functional status/changes:   [] No changes reported  \"I was doing a lot of yard work earlier today, so my neck is more sore right now. \"    OBJECTIVE    18 min Therapeutic Exercise:  [x] See flow sheet :   Rationale: increase ROM, increase strength and decrease pain to improve the patients ability to complete ADLs  ambulation safety and efficiency in order to improve patient's ability to safely ambulate at home for self care. 15 min Therapeutic Activity:  [x]  See flow sheet :   Rationale: increase ROM, increase strength and improve coordination  to improve the patients ability to Complete ADLS     15 min Neuromuscular Re-education:  [x]  See flow sheet :   Rationale: improve coordination, improve balance and increase proprioception  to improve the patients ability to complete ADLS, and decrease falls risk    8 min Manual Therapy:  manual cervical distraction, occipital release, left mid-cervical facet mobilization Grade 2-3. Rationale: decrease pain and increase ROM to improve the patients ability to complete ADLS  The Manual Therapy interventions were performed at a separate and distinct time from the Therapeutic Activities interventions.     With   [] TE   [] TA   [] neuro   [] other: Patient Education: [x] Review HEP    [] Progressed/Changed HEP based on:   [] positioning   [] body mechanics   [] transfers   [] heat/ice application [] other:      Other Objective/Functional Measures: NA     Pain Level (0-10 scale) post treatment: 10    ASSESSMENT/Changes in Function: Patient instructed in additional stretches to assist in dealing with report of increased stiffness. Patient responds well to treatment session. No adverse effects were noted from today's treatment session. Patient will continue to benefit from skilled PT services to modify and progress therapeutic interventions, address functional mobility deficits, address ROM deficits, address strength deficits, analyze and address soft tissue restrictions, analyze and cue movement patterns, analyze and modify body mechanics/ergonomics, assess and modify postural abnormalities,  and instruct in home and community integration to attain remaining goals. [x]  See Plan of Care  []  See progress note/recertification  []  See Discharge Summary         Progress towards goals / Updated goals:  Short Term Goals: To be accomplished in 4 weeks:                   Patient will report compliance with HEP 1x/day to aid in rehabilitation program.                   Status at Ie: Patient instructed in and provided written copy of initial Home Exercise Program.                   Current: Patient reports good initial compliance with basic exercises provided for initial HEP.     8/18/2022, in progress                      Patient will increase cervical ROM to WNL in all planes to aid in completion of ADLs. Status at IE:flex 22, ext 32, SB right 27, left 22, rotation right 50, left 37                   Current: flex 44, ext 45, SB right 33, left 37, rotation right 72, left 56.       8/30/2022, in progress     Long Term Goals: To be accomplished in 8 weeks:                   Patient will increase cervical muscle strength to 5/5 MMT throughout to aid in completion of ADLs. Status at IE:4/5 throughout                   Current 8/26/22: cervical 4/5 MMT.  IN PROGRESS Patient will report pain no greater than 1-2/10 throughout entire day to aid in completion of ADLs. Status at IE:2-4/10                   Current: Same as IE                      Patent will be able to sleep undisturbed and perform self care ADLs without neck pain. Status at [address] [facility] wakes patient frequently at night, moderate difficulty washing hair and driving. Current 8/25/22: Patient is able to perform ADL's with less pain. IN PROGRESS                      Patient will improve FOTO (an established functional score where 100 = no disability) score to 62 points to demonstrate improvement in functional status.                    Status at IE:54                   Current: Same as IE    PLAN  []  Upgrade activities as tolerated     [x]  Continue plan of care  []  Update interventions per flow sheet       []  Discharge due to:_  []  Other:_      Nathan Tan, PT 8/30/2022  4:58 PM    Future Appointments   Date Time Provider Kolby Ruiz   9/7/2022  2:00 PM Amaury Vincent THE [facility]   9/9/2022  2:45 PM Aris Wtason, GILSON GALARZA THE [facility]   9/12/2022  9:30 AM Son Garcia, GILSON GALARZA THE [facility]   9/14/2022  9:30 AM GILSON Vincent THE [facility]

## 2022-09-07 ENCOUNTER — HOSPITAL ENCOUNTER (OUTPATIENT)
Dept: PHYSICAL THERAPY | Age: 52
Discharge: HOME | End: 2022-09-07
Payer: COMMERCIAL

## 2022-09-07 PROCEDURE — 97112 NEUROMUSCULAR REEDUCATION: CPT

## 2022-09-07 PROCEDURE — 97530 THERAPEUTIC ACTIVITIES: CPT

## 2022-09-07 PROCEDURE — 97110 THERAPEUTIC EXERCISES: CPT

## 2022-09-07 PROCEDURE — 97140 MANUAL THERAPY 1/> REGIONS: CPT

## 2022-09-07 NOTE — PROGRESS NOTES
PT DAILY TREATMENT NOTE    Patient Name: Patty Ramirez  [id number]  : 1970  [x]  Patient  Verified  Payor: Boris Stevens / [place] [employer] / Product Type: PPO /    In time: 200  Out time: 259  Total Treatment Time (min): 59  Total Timed Codes (min): 61   Visit #: 5 of 16    Treatment Dx: Cervicalgia [M54.2]    SUBJECTIVE  Pain Level (0-10 scale): 0  Any medication changes, allergies to medications, adverse drug reactions, diagnosis change, or new procedure performed?: [x] No    [] Yes (see summary sheet for update)  Subjective functional status/changes:   [] No changes reported  \"I had a minor procedure on my right foot, so I have been on Tylenol every four hours and as a result of the medication my neck is not bothering me at all. \"    OBJECTIVE    19 min Therapeutic Exercise:  [x] See flow sheet :   Rationale: increase ROM, increase strength and decrease pain to improve the patients ability to complete ADLs  ambulation safety and efficiency in order to improve patient's ability to safely ambulate at home for self care. 15 min Therapeutic Activity:  [x]  See flow sheet :   Rationale: increase ROM, increase strength and improve coordination  to improve the patients ability to Complete ADLS     15 min Neuromuscular Re-education:  [x]  See flow sheet :   Rationale: improve coordination, improve balance and increase proprioception  to improve the patients ability to complete ADLS, and decrease falls risk    10 min Manual Therapy:  manual cervical distraction, occipital release, left mid-cervical facet mobilization Grade 2-3. Rationale: decrease pain and increase ROM to improve the patients ability to complete ADLS  The Manual Therapy interventions were performed at a separate and distinct time from the Therapeutic Activities interventions.      With   [] TE   [] TA   [] neuro   [] other: Patient Education: [x] Review HEP    [] Progressed/Changed HEP based on:   [] positioning   [] body mechanics [] transfers   [] heat/ice application    [] other:      Other Objective/Functional Measures: NA     Pain Level (0-10 scale) post treatment: 0    ASSESSMENT/Changes in Function: Patient instructed in next level intensity of exercises, adding PNF with cervical motion during resistance. Also educated patient regarding advantages and disadvantages of surgical options for neck pain. Patient responds well to treatment session. No adverse effects were noted from today's treatment session. Patient will continue to benefit from skilled PT services to modify and progress therapeutic interventions, address functional mobility deficits, address ROM deficits, address strength deficits, analyze and address soft tissue restrictions, analyze and cue movement patterns, analyze and modify body mechanics/ergonomics, assess and modify postural abnormalities,  and instruct in home and community integration to attain remaining goals. [x]  See Plan of Care  []  See progress note/recertification  []  See Discharge Summary         Progress towards goals / Updated goals:  Short Term Goals: To be accomplished in 4 weeks:                   Patient will report compliance with HEP 1x/day to aid in rehabilitation program.                   Status at Ie: Patient instructed in and provided written copy of initial Home Exercise Program.                   Current: Patient reports good initial compliance with basic exercises provided for initial HEP.     8/18/2022, in progress                      Patient will increase cervical ROM to WNL in all planes to aid in completion of ADLs. Status at IE:flex 22, ext 32, SB right 27, left 22, rotation right 50, left 37                   Current: flex 44, ext 45, SB right 33, left 37, rotation right 72, left 56.       8/30/2022, in progress     Long Term Goals:  To be accomplished in 8 weeks:                   Patient will increase cervical muscle strength to 5/5 MMT throughout to aid in completion of ADLs. Status at IE:4/5 throughout                   Current 8/26/22: cervical 4/5 MMT. IN PROGRESS                      Patient will report pain no greater than 1-2/10 throughout entire day to aid in completion of ADLs. Status at IE:2-4/10                   Current: At present 0/10, but on Tylenol due to foot surgery. 9/7/2022, in progress                      Patent will be able to sleep undisturbed and perform self care ADLs without neck pain. Status at [facility] wakes patient frequently at night, moderate difficulty washing hair and driving. Current 8/25/22: Patient is able to perform ADL's with less pain. IN PROGRESS                      Patient will improve FOTO (an established functional score where 100 = no disability) score to 62 points to demonstrate improvement in functional status.                    Status at IE:54                   Current: Same as IE    PLAN  []  Upgrade activities as tolerated     [x]  Continue plan of care  []  Update interventions per flow sheet       []  Discharge due to:_  []  Other:_      Ulysses Sang, PT 9/7/2022  2:02 PM    Future Appointments   Date Time Provider Kolby Ruiz   9/9/2022  2:45 PM GILSON Bhagat THE [facility]   9/12/2022  9:30 AM Bobbe Day THE [facility]   9/14/2022  9:30 AM GILSON Bhagat THE [facility]

## 2022-09-09 ENCOUNTER — HOSPITAL ENCOUNTER (OUTPATIENT)
Dept: PHYSICAL THERAPY | Age: 52
Discharge: HOME | End: 2022-09-09
Payer: COMMERCIAL

## 2022-09-09 PROCEDURE — 97140 MANUAL THERAPY 1/> REGIONS: CPT

## 2022-09-09 PROCEDURE — 97110 THERAPEUTIC EXERCISES: CPT

## 2022-09-09 PROCEDURE — 97530 THERAPEUTIC ACTIVITIES: CPT

## 2022-09-09 PROCEDURE — 97112 NEUROMUSCULAR REEDUCATION: CPT

## 2022-09-09 NOTE — PROGRESS NOTES
PT DAILY TREATMENT NOTE    Patient Name: Jenna Nation  Date:2022  : 1970  [x]  Patient  Verified  Payor: Alcira Naik / [place] [employer] / Product Type: PPO /    In time: 243  Out time: 344  Total Treatment Time (min): 61  Total Timed Codes (min): 61   Visit #: 7 of 16    Treatment Dx: Cervicalgia [M54.2]    SUBJECTIVE  Pain Level (0-10 scale): 1  Any medication changes, allergies to medications, adverse drug reactions, diagnosis change, or new procedure performed?: [x] No    [] Yes (see summary sheet for update)  Subjective functional status/changes:   [] No changes reported  \"We are definitely headed in the right direction. I can do more things now without the pain coming back. \"    OBJECTIVE    21 min Therapeutic Exercise:  [x] See flow sheet :   Rationale: increase ROM, increase strength and decrease pain to improve the patients ability to complete ADLs  ambulation safety and efficiency in order to improve patient's ability to safely ambulate at home for self care. 15 min Therapeutic Activity:  [x]  See flow sheet :   Rationale: increase ROM, increase strength and improve coordination  to improve the patients ability to Complete ADLS     15 min Neuromuscular Re-education:  [x]  See flow sheet :   Rationale: improve coordination, improve balance and increase proprioception  to improve the patients ability to complete ADLS, and decrease falls risk    10 min Manual Therapy:  manual cervical distraction, occipital release, left mid-cervical facet mobilization Grade 2-3. Rationale: decrease pain and increase ROM to improve the patients ability to complete ADLS  The Manual Therapy interventions were performed at a separate and distinct time from the Therapeutic Activities interventions.     With   [] TE   [] TA   [] neuro   [] other: Patient Education: [x] Review HEP    [] Progressed/Changed HEP based on:   [] positioning   [] body mechanics   [] transfers   [] heat/ice application    [] other: Other Objective/Functional Measures: NA     Pain Level (0-10 scale) post treatment: 0    ASSESSMENT/Changes in Function: Patient noting consistent gradual improvements in functional activity tolerance. Patient tolerates exercises but still requires intermittent verbal and visual cues for correction of technique especially with newer exercises. Patient responds well to treatment session. No adverse effects were noted from today's treatment session. Patient will continue to benefit from skilled PT services to modify and progress therapeutic interventions, address functional mobility deficits, address ROM deficits, address strength deficits, analyze and address soft tissue restrictions, analyze and cue movement patterns, analyze and modify body mechanics/ergonomics, assess and modify postural abnormalities,  and instruct in home and community integration to attain remaining goals. [x]  See Plan of Care  []  See progress note/recertification  []  See Discharge Summary         Progress towards goals / Updated goals:  Short Term Goals: To be accomplished in 4 weeks:                   Patient will report compliance with HEP 1x/day to aid in rehabilitation program.                   Status at Ie: Patient instructed in and provided written copy of initial Home Exercise Program.                   Current: Patient reports good initial compliance with basic exercises provided for initial HEP.     8/18/2022, in progress                      Patient will increase cervical ROM to WNL in all planes to aid in completion of ADLs. Status at IE:flex 22, ext 32, SB right 27, left 22, rotation right 50, left 37                   Current: flex 44, ext 45, SB right 33, left 37, rotation right 72, left 56.       8/30/2022, in progress     Long Term Goals: To be accomplished in 8 weeks:                   Patient will increase cervical muscle strength to 5/5 MMT throughout to aid in completion of ADLs. Status at IE:4/5 throughout                   Current 8/26/22: cervical 4/5 MMT. IN PROGRESS                      Patient will report pain no greater than 1-2/10 throughout entire day to aid in completion of ADLs. Status at IE:2-4/10                   Current: At present 0/10, but on Tylenol due to foot surgery. 9/7/2022, in progress                      Patent will be able to sleep undisturbed and perform self care ADLs without neck pain. Status at [facility] wakes patient frequently at night, moderate difficulty washing hair and driving. Current 8/25/22: Patient is able to perform ADL's with less pain. IN PROGRESS                      Patient will improve FOTO (an established functional score where 100 = no disability) score to 62 points to demonstrate improvement in functional status.                    Status at IE:54                   Current: Same as IE    PLAN  []  Upgrade activities as tolerated     [x]  Continue plan of care  []  Update interventions per flow sheet       []  Discharge due to:_  []  Other:_      Nnamdi Thomas PT 9/9/2022  2:44 PM    Future Appointments   Date Time Provider Kolby Ruiz   9/9/2022  2:45 PM GILSON Rosado THE [facility]   9/12/2022  9:30 AM Mamadou Burk THE [facility]   9/14/2022  9:30 AM GILSON Rosado THE [facility]

## 2022-09-12 ENCOUNTER — HOSPITAL ENCOUNTER (OUTPATIENT)
Dept: PHYSICAL THERAPY | Age: 52
Discharge: HOME | End: 2022-09-12
Payer: COMMERCIAL

## 2022-09-12 PROCEDURE — 97112 NEUROMUSCULAR REEDUCATION: CPT

## 2022-09-12 PROCEDURE — 97110 THERAPEUTIC EXERCISES: CPT

## 2022-09-12 PROCEDURE — 97530 THERAPEUTIC ACTIVITIES: CPT

## 2022-09-12 NOTE — PROGRESS NOTES
PT DAILY TREATMENT NOTE     Patient Name: Sonal Dennis  Date:2022  : 1970  [x]  Patient  Verified  Payor: Chitra Ground / [place] [employer] / Product Type: PPO /    In [id number]  Out time:1030  Total Treatment Time (min): 57  Total Timed Codes (min): 62   Visit #: 8 of 16    Treatment Area: Cervicalgia [M54.2]    SUBJECTIVE  Pain Level (0-10 scale): 0  Any medication changes, allergies to medications, adverse drug reactions, diagnosis change, or new procedure performed?: [x] No    [] Yes (see summary sheet for update)  Subjective functional status/changes:   [] No changes reported    Patient reports that stretching is helping to manage her headaches. OBJECTIVE    25 min Therapeutic Exercise:  [x] See flow sheet :   Rationale: increase ROM, increase strength and decrease pain to improve the patients ability to complete ADLs    10 min Therapeutic Activity:  [x]  See flow sheet :   Rationale: increase ROM, increase strength and improve coordination  to improve the patients ability to complete ADLs     12 min Neuromuscular Re-education:  [x]  See flow sheet :   Rationale: improve coordination, improve balance and increase proprioception  to improve the patients ability to complete ADLs        With   [x] TE   [] TA   [] neuro   [] other: Patient Education: [x] Review HEP    [] Progressed/Changed HEP based on:   [] positioning   [] body mechanics   [] transfers   [] heat/ice application    [] other:      Other Objective/Functional Measures: NA     Pain Level (0-10 scale) post treatment: 0    ASSESSMENT/Changes in Function:   Patient responds well to treatment session. Advanced exercise by introducing cervical retraction isometric with shoulder flexion and abduction with resistance. Provided cues to perform cervical flexion with proper mechanics. Patient ws challenged with exercise prescribed.  No adverse effects were noted from today's treatment session      Patient will continue to benefit from skilled PT services to modify and progress therapeutic interventions, address functional mobility deficits, address ROM deficits, address strength deficits, analyze and address soft tissue restrictions, analyze and cue movement patterns, analyze and modify body mechanics/ergonomics, assess and modify postural abnormalities, address imbalance and instruct in home and community integration to attain remaining goals. []  See Plan of Care  []  See progress note/recertification  []  See Discharge Summary         Progress towards goals / Updated goals:  Short Term Goals: To be accomplished in 4 weeks:                   Patient will report compliance with HEP 1x/day to aid in rehabilitation program.                   Status at Ie: Patient instructed in and provided written copy of initial Home Exercise Program.                   Current: [facility], 9/12/2022                      Patient will increase cervical ROM to WNL in all planes to aid in completion of ADLs. Status at IE:flex 22, ext 32, SB right 27, left 22, rotation right 50, left 37                   Current: flex 44, ext 45, SB right 33, left 37, rotation right 72, left 56.       8/30/2022, in progress     Long Term Goals: To be accomplished in 8 weeks:                   Patient will increase cervical muscle strength to 5/5 MMT throughout to aid in completion of ADLs. Status at IE:4/5 throughout                   Current 8/26/22: cervical 4/5 MMT. IN PROGRESS                      Patient will report pain no greater than 1-2/10 throughout entire day to aid in completion of ADLs. Status at IE:2-4/10                   Current: At present 0/10, but on Tylenol due to foot surgery. 9/7/2022, in progress                      Patent will be able to sleep undisturbed and perform self care ADLs without neck pain.                    Status at [address] [place] [address] wakes patient frequently at night, moderate difficulty washing hair and driving. Current 8/25/22: Patient is able to perform ADL's with less pain. IN PROGRESS                      Patient will improve FOTO (an established functional score where 100 = no disability) score to 62 points to demonstrate improvement in functional status.                    Status at IE:54                   Current: Same as IE    PLAN  []  Upgrade activities as tolerated     [x]  Continue plan of care  []  Update interventions per flow sheet       []  Discharge due to:_  []  Other:_      Sarah Bull, PT, DPT 9/12/2022  9:38 AM    Future Appointments   Date Time Provider Kolby Ruiz   9/14/2022  9:30 AM Fidencio Roger, [facility]   9/20/2022 11:45 AM Bear Edwards, GILSON [facility]   9/23/2022 11:00 AM Rukhsana Pena PTA [facility]

## 2022-09-14 ENCOUNTER — HOSPITAL ENCOUNTER (OUTPATIENT)
Dept: PHYSICAL THERAPY | Age: 52
Discharge: HOME | End: 2022-09-14
Payer: COMMERCIAL

## 2022-09-14 PROCEDURE — 97112 NEUROMUSCULAR REEDUCATION: CPT

## 2022-09-14 PROCEDURE — 97530 THERAPEUTIC ACTIVITIES: CPT

## 2022-09-14 PROCEDURE — 97140 MANUAL THERAPY 1/> REGIONS: CPT

## 2022-09-14 PROCEDURE — 97110 THERAPEUTIC EXERCISES: CPT

## 2022-09-14 NOTE — PROGRESS NOTES
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  female who has been well motivated, consistent and diligent with PT and HEP and as a result is making excellent progress towards goals. Pain is now absent at rest and with most routine ADLs. Cervical AROM is increasing and strengthening exercises are being advanced to higher intensity with good tolerance. Patient will continue to benefit from skilled PT services to modify and progress therapeutic interventions, address functional mobility deficits, address ROM deficits, address strength deficits, analyze and address soft tissue restrictions, analyze and cue movement patterns, analyze and modify body mechanics/ergonomics, assess and modify postural abnormalities,  and instruct in home and community integration to attain remaining goals.    ===========================================================================================    Plan:Continue therapy per initial plan/protocol at a frequency of  2 x per week for 3 weeks then transition to independent self care. Progress Towards Goals:   Short Term Goals:  To be accomplished in 4 weeks:                   Patient will report compliance with HEP 1x/day to aid in rehabilitation program.                   Status at Ie: Patient instructed in and provided written copy of initial Home Exercise Program.                   Current: Met, 9/12/2022                      Patient will increase cervical ROM to WNL in all planes to aid in completion of ADLs. Status at IE:flex 22, ext 32, SB right 27, left 22, rotation right 50, left 37                   Current: flex 44, ext 45, SB right 33, left 37, rotation right 72, left 56.       8/30/2022, in progress     Long Term Goals: To be accomplished in 8 weeks:                   Patient will increase cervical muscle strength to 5/5 MMT throughout to aid in completion of ADLs. Status at IE:4/5 throughout                   Current 8/26/22: cervical 4/5 MMT. IN PROGRESS                      Patient will report pain no greater than 1-2/10 throughout entire day to aid in completion of ADLs. Status at IE:2-4/10                   Current: At present 0/10, but on Tylenol due to foot surgery. 9/7/2022, in progress                      Patent will be able to sleep undisturbed and perform self care ADLs without neck pain. Status at [address] [facility] wakes patient frequently at night, moderate difficulty washing hair and driving. Current 8/25/22: Patient is able to perform ADL's with less pain. IN PROGRESS                      Patient will improve FOTO (an established functional score where 100 = no disability) score to 62 points to demonstrate improvement in functional status. Status at IE:54                   Current: 67      9/14/2022, MET    ===========================================================================================  Subjective: \"I am feeling noticeably better. This is the first time in a long time that I have gone full days without any neck pain. \"        Therapist Signature: Rc Duvall PT, DPT Date: 8/82/2478   Re-Certification: NA Time: 9:57 AM         [facility] at [facility] MICHAEL Grimes[address]                    Phone: [phone]   Fax: [phone]  .

## 2022-09-14 NOTE — PROGRESS NOTES
PT DAILY TREATMENT NOTE    Patient Name: Debera Eisenmenger  Date:2022  : 1970  [x]  Patient  Verified  Payor: Demetrice Goldstein / Plan: [place] [employer] [place] / Product Type: PPO /    In time: 927  Out time:   Total Treatment Time (min): 56  Total Timed Codes (min): 56   Visit #: 9 of 16    Treatment Dx: Cervicalgia [M54.2]    SUBJECTIVE  Pain Level (0-10 scale): 0  Any medication changes, allergies to medications, adverse drug reactions, diagnosis change, or new procedure performed?: [x] No    [] Yes (see summary sheet for update)  Subjective functional status/changes:   [] No changes reported  \"I am feeling noticeably better. This is the first time in a long time that I have gone full days without any neck pain. \"    OBJECTIVE    20 min Therapeutic Exercise:  [x] See flow sheet :   Rationale: increase ROM, increase strength and decrease pain to improve the patients ability to complete ADLs  ambulation safety and efficiency in order to improve patient's ability to safely ambulate at home for self care. 15 min Therapeutic Activity:  [x]  See flow sheet :   Rationale: increase ROM, increase strength and improve coordination  to improve the patients ability to Complete ADLS     11 min Neuromuscular Re-education:  [x]  See flow sheet :   Rationale: improve coordination, improve balance and increase proprioception  to improve the patients ability to complete ADLS, and decrease falls risk    10 min Manual Therapy:  manual cervical distraction, occipital release, left mid-cervical facet mobilization Grade 2-3. Rationale: decrease pain and increase ROM to improve the patients ability to complete ADLS  The Manual Therapy interventions were performed at a separate and distinct time from the Therapeutic Activities interventions.     With   [] TE   [] TA   [] neuro   [] other: Patient Education: [x] Review HEP    [] Progressed/Changed HEP based on:   [] positioning   [] body mechanics   [] transfers   [] heat/ice application    [] other:      Other Objective/Functional Measures: NA     Pain Level (0-10 scale) post treatment: 0    ASSESSMENT/Changes in Function: Patient has been well motivated, consistent and diligent with PT and HEP and as a result is making excellent progress towards goals. Pain is now absent at rest and with most routine ADLs. Cervical AROM is increasing and strengthening exercises are being advanced to higher intensity with good tolerance. Patient responds well to treatment session. No adverse effects were noted from today's treatment session. Patient will continue to benefit from skilled PT services to modify and progress therapeutic interventions, address functional mobility deficits, address ROM deficits, address strength deficits, analyze and address soft tissue restrictions, analyze and cue movement patterns, analyze and modify body mechanics/ergonomics, assess and modify postural abnormalities,  and instruct in home and community integration to attain remaining goals. []  See Plan of Care  [x]  See progress note/recertification  []  See Discharge Summary         Progress towards goals / Updated goals:  Short Term Goals: To be accomplished in 4 weeks:                   Patient will report compliance with HEP 1x/day to aid in rehabilitation program.                   Status at Ie: Patient instructed in and provided written copy of initial Home Exercise Program.                   Current: Met, 9/12/2022                      Patient will increase cervical ROM to WNL in all planes to aid in completion of ADLs. Status at IE:flex 22, ext 32, SB right 27, left 22, rotation right 50, left 37                   Current: flex 44, ext 45, SB right 33, left 37, rotation right 72, left 56.       8/30/2022, in progress     Long Term Goals: To be accomplished in 8 weeks:                   Patient will increase cervical muscle strength to 5/5 MMT throughout to aid in completion of ADLs. Status at IE:4/5 throughout                   Current 8/26/22: cervical 4/5 MMT. IN PROGRESS                      Patient will report pain no greater than 1-2/10 throughout entire day to aid in completion of ADLs. Status at IE:2-4/10                   Current: At present 0/10, but on Tylenol due to foot surgery. 9/7/2022, in progress                      Patent will be able to sleep undisturbed and perform self care ADLs without neck pain. Status at [address] [facility] wakes patient frequently at night, moderate difficulty washing hair and driving. Current 8/25/22: Patient is able to perform ADL's with less pain. IN PROGRESS                      Patient will improve FOTO (an established functional score where 100 = no disability) score to 62 points to demonstrate improvement in functional status.                    Status at IE:54                   Current: 67      9/14/2022, MET    PLAN  []  Upgrade activities as tolerated     [x]  Continue plan of care  []  Update interventions per flow sheet       []  Discharge due to:_  []  Other:_      José Antoine, PT 9/14/2022  9:32 AM    Future Appointments   Date Time Provider Kolby Ruiz   9/20/2022 11:45 AM Eduarda Morrow, GILSON GALARZA [facility]   9/23/2022 11:00 AM DEMETRICE Musa [facility]

## 2022-09-20 ENCOUNTER — APPOINTMENT (OUTPATIENT)
Dept: PHYSICAL THERAPY | Age: 52
End: 2022-09-20
Payer: COMMERCIAL

## 2022-09-20 ENCOUNTER — HOSPITAL ENCOUNTER (OUTPATIENT)
Dept: PHYSICAL THERAPY | Age: 52
Discharge: HOME | End: 2022-09-20
Payer: COMMERCIAL

## 2022-09-20 PROCEDURE — 97530 THERAPEUTIC ACTIVITIES: CPT

## 2022-09-20 PROCEDURE — 97110 THERAPEUTIC EXERCISES: CPT

## 2022-09-20 PROCEDURE — 97112 NEUROMUSCULAR REEDUCATION: CPT

## 2022-09-20 NOTE — PROGRESS NOTES
PT DAILY TREATMENT NOTE - [facility]     Patient Name: Dareen Siemens  Date:2022  : 1970  [x]  Patient  Verified  Payor: Catarino Vegas / Plan: [place] [employer] [place] / Product Type: PPO /    In time:1145  Out time:1240  Total Treatment Time (min): 55  Total Timed Codes (min): 55   Visit #:  16    Treatment Area: Cervicalgia [M54.2]    SUBJECTIVE  Pain Level (0-10 scale): 0  Any medication changes, allergies to medications, adverse drug reactions, diagnosis change, or new procedure performed?: [x] No    [] Yes (see summary sheet for update)  Subjective functional status/changes:   [] No changes reported    Patient reports that she is doing well. She reports that she will be out of the treatment area on vacation for two weeks and will follow up after her trip. OBJECTIVE    30 min Therapeutic Exercise:  [x] See flow sheet :   Rationale: increase ROM, increase strength and decrease pain to improve the patients ability to complete ADLs    10 min Therapeutic Activity:  [x]  See flow sheet :   Rationale: increase ROM, increase strength and improve coordination  to improve the patients ability to complete ADLs     15 min Neuromuscular Re-education:  [x]  See flow sheet :   Rationale: improve coordination, improve balance and increase proprioception  to improve the patients ability to complete ADLs          With   [x] TE   [] TA   [] neuro   [] other: Patient Education: [x] Review HEP    [] Progressed/Changed HEP based on:   [] positioning   [] body mechanics   [] transfers   [] heat/ice application    [] other:      Other Objective/Functional Measures: NA     Pain Level (0-10 scale) post treatment: 0    ASSESSMENT/Changes in Function: Patient responds well to treatment session. Advanced cervical stabilization by introducing quadruped cervical retraction. Provided verbal and tactile cues to promote proper mechanics and target muscle recruitment. Will follow up with patient when she returns to treatment area.  No adverse effects were noted from today's treatment session    Patient will continue to benefit from skilled PT services to modify and progress therapeutic interventions, address functional mobility deficits, address ROM deficits, address strength deficits, analyze and address soft tissue restrictions, analyze and cue movement patterns, analyze and modify body mechanics/ergonomics, assess and modify postural abnormalities, address imbalance and instruct in home and community integration to attain remaining goals. []  See Plan of Care  []  See progress note/recertification  []  See Discharge Summary         Progress towards goals / Updated goals:  Short Term Goals: To be accomplished in 4 weeks:                   Patient will report compliance with HEP 1x/day to aid in rehabilitation program.                   Status at Ie: Patient instructed in and provided written copy of initial Home Exercise Program.                   Current: Met, 9/12/2022                      Patient will increase cervical ROM to WNL in all planes to aid in completion of ADLs. Status at IE:flex 22, ext 32, SB right 27, left 22, rotation right 50, left 37                   Current: flex 44, ext 45, SB right 33, left 37, rotation right 72, left 56.       8/30/2022, in progress     Long Term Goals: To be accomplished in 8 weeks:                   Patient will increase cervical muscle strength to 5/5 MMT throughout to aid in completion of ADLs. Status at IE:4/5 throughout                   Current 8/26/22: cervical 4/5 MMT. IN PROGRESS                      Patient will report pain no greater than 1-2/10 throughout entire day to aid in completion of ADLs. Status at IE:2-4/10                   Current: At present 0/10, but on Tylenol due to foot surgery. 9/7/2022, in progress                      Patent will be able to sleep undisturbed and perform self care ADLs without neck pain. Status at [address] [facility] wakes patient frequently at night, moderate difficulty washing hair and driving. Current Met, 9/20/2022                      Patient will improve FOTO (an established functional score where 100 = no disability) score to 62 points to demonstrate improvement in functional status. Status at IE:54                   Current: 67      9/14/2022, MET    PLAN  []  Upgrade activities as tolerated     [x]  Continue plan of care  []  Update interventions per flow sheet       []  Discharge due to:_  []  Other:_      Tanna Friedman, PT, DPT 9/20/2022  11:53 AM    No future appointments.

## 2022-09-23 ENCOUNTER — APPOINTMENT (OUTPATIENT)
Dept: PHYSICAL THERAPY | Age: 52
End: 2022-09-23
Payer: COMMERCIAL

## 2022-09-29 ENCOUNTER — APPOINTMENT (OUTPATIENT)
Dept: PHYSICAL THERAPY | Age: 52
End: 2022-09-29
Payer: COMMERCIAL

## 2022-09-30 ENCOUNTER — HOSPITAL ENCOUNTER (OUTPATIENT)
Dept: PHYSICAL THERAPY | Age: 52
Discharge: HOME | End: 2022-09-30
Payer: COMMERCIAL

## 2022-09-30 PROCEDURE — 97530 THERAPEUTIC ACTIVITIES: CPT

## 2022-09-30 PROCEDURE — 97112 NEUROMUSCULAR REEDUCATION: CPT

## 2022-09-30 PROCEDURE — 97110 THERAPEUTIC EXERCISES: CPT

## 2022-09-30 NOTE — PROGRESS NOTES
PT DAILY TREATMENT NOTE    Patient Name: Maykel Batista  Date:2022  : 1970  [x]  Patient  Verified  Payor: Vitaly Proctor / Plan: [place] [employer] [place] / Product Type: PPO /    In time: 1320  Out time: 1424  Total Treatment Time (min): 64  Total Timed Codes (min): 59  Visit #: 11 of 16    Treatment Dx: Cervicalgia [M54.2]    SUBJECTIVE  Pain Level (0-10 scale): cervical 0  Any medication changes, allergies to medications, adverse drug reactions, diagnosis change, or new procedure performed?: [x] No    [] Yes (see summary sheet for update)  Subjective functional status/changes:   [] No changes reported  \"My neck is feeling much better. But, my low back has been getting bad and it even caused me to have to miss PT last week. \"    OBJECTIVE    29 min Therapeutic Exercise:  [x] See flow sheet :   Rationale: increase ROM, increase strength and decrease pain to improve the patients ability to complete ADLs  ambulation safety and efficiency in order to improve patient's ability to safely ambulate at home for self care. 15 min Therapeutic Activity:  [x]  See flow sheet :   Rationale: increase ROM, increase strength and improve coordination  to improve the patients ability to Complete ADLS     15 min Neuromuscular Re-education:  [x]  See flow sheet :   Rationale: improve coordination, improve balance and increase proprioception  to improve the patients ability to complete ADLS, and decrease falls risk    With   [] TE   [] TA   [] neuro   [] other: Patient Education: [x] Review HEP    [] Progressed/Changed HEP based on:   [] positioning   [] body mechanics   [] transfers   [] heat/ice application    [] other:      Other Objective/Functional Measures: NA     Pain Level (0-10 scale) post treatment: 0    ASSESSMENT/Changes in Function: Patient has been well motivated, diligent and consistent with PT and HEP and as a result has made excellent progress toward goals.  Cervical pain is virtually resolved, AROM and strength are now WNL and functional measure has met goals. Patient is now ready to discontinue PT and transition to independent self care. Patient responds well to treatment session. No adverse effects were noted from today's treatment session. []  See Plan of Care  []  See progress note/recertification  [x]  See Discharge Summary         Progress towards goals / Updated goals:  Updated goals:  Short Term Goals: To be accomplished in 4 weeks:                   Patient will report compliance with HEP 1x/day to aid in rehabilitation program.                   Status at Ie: Patient instructed in and provided written copy of initial Home Exercise Program.                   Current: Met, 9/12/2022                      Patient will increase cervical ROM to WNL in all planes to aid in completion of ADLs. Status at IE:flex 22, ext 32, SB right 27, left 22, rotation right 50, left 37                   Current: flex 50, ext 52, SB right 40, left 40, rotation right 72, left 70.       9/30/2022, MET     Long Term Goals: To be accomplished in 8 weeks:                   Patient will increase cervical muscle strength to 5/5 MMT throughout to aid in completion of ADLs. Status at IE:4/5 throughout                   Current: cervical 5/5 MMT.    9/30/2022, MET                      Patient will report pain no greater than 1-2/10 throughout entire day to aid in completion of ADLs. Status at IE:2-4/10                   Current: Now consistently at 0/10,.    9/30/2022, MET                      Patent will be able to sleep undisturbed and perform self care ADLs without neck pain. Status at [address] [place] [address] wakes patient frequently at night, moderate difficulty washing hair and driving.                    Current Met, 9/20/2022                      Patient will improve FOTO (an established functional score where 100 = no disability) score to 62 points to demonstrate improvement in functional status. Status at IE:54                   Current: 65      9/14/2022, MET    PLAN  []  Upgrade activities as tolerated     [x]  Continue plan of care  []  Update interventions per flow sheet       [x]  Discharge due to:_Met goals  []  Other:_      Nery Rocha, PT 9/30/2022  1:24 PM    No future appointments.

## 2022-09-30 NOTE — PROGRESS NOTES
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   female who has been well motivated, diligent and consistent with PT and HEP and as a result has made excellent progress toward goals. Cervical pain is virtually resolved, AROM and strength are now WNL and functional measure has met goals. Patient is now ready to discontinue PT and transition to independent self care.     ===========================================================================================    Plan: Discharge to independent HEP. Progress Towards Goals:     Short Term Goals: To be accomplished in 4 weeks:                   Patient will report compliance with HEP 1x/day to aid in rehabilitation program.                   Status at Ie: Patient instructed in and provided written copy of initial Home Exercise Program.                   Current: Met, 2022                      Patient will increase cervical ROM to WNL in all planes to aid in completion of ADLs. Status at IE:flex 22, ext 32, SB right 27, left 22, rotation right 50, left 37                   Current: flex 50, ext 52, SB right 40, left 40, rotation right 72, left 70.       2022, MET     Long Term Goals:  To be accomplished in 8 weeks:                   Patient will increase cervical muscle strength to 5/5 MMT throughout to aid in completion of ADLs. Status at IE:4/5 throughout                   Current: cervical 5/5 MMT.    9/30/2022, MET                      Patient will report pain no greater than 1-2/10 throughout entire day to aid in completion of ADLs. Status at IE:2-4/10                   Current: Now consistently at 0/10,.    9/30/2022, MET                      Patent will be able to sleep undisturbed and perform self care ADLs without neck pain. Status at [address] [facility] wakes patient frequently at night, moderate difficulty washing hair and driving. Current Met, 9/20/2022                      Patient will improve FOTO (an established functional score where 100 = no disability) score to 62 points to demonstrate improvement in functional status.                    Status at IE:54                   Current: 65      9/14/2022, MET    ===========================================================================================    Therapist Signature: Laci Luque PT, LAURA Date: 9/30/2022     Time: 3:01 PM

## 2022-09-30 NOTE — PROGRESS NOTES
Physical Therapy Discharge Instructions    [facility] at [address] [place] [address]  Phone: [phone]   Fax: [phone]      Patient: Rodrigue Carroll  : 1970    Continue Home Exercise Program 2 times per day for 3 weeks, then decrease to 4 times per week      Continue with    [x] Ice  as needed      [] Heat           Follow up with MD:     [] Upon completion of therapy     [x] As needed      Recommendations:     [x]   Return to activity with home program    []   Return to activity with the following modifications:       []Post Rehab Program    []Join Independent aquatic program     []Return to/join local gym      Additional Comments: Please contact clinic at above phone number if you have any questions regarding Home Exercise Program or self care instructions.       Valdo Viveros, PT 2022 3:03 PM